# Patient Record
Sex: FEMALE | Race: WHITE | NOT HISPANIC OR LATINO | Employment: OTHER | URBAN - METROPOLITAN AREA
[De-identification: names, ages, dates, MRNs, and addresses within clinical notes are randomized per-mention and may not be internally consistent; named-entity substitution may affect disease eponyms.]

---

## 2017-11-09 ENCOUNTER — RECORDS - HEALTHEAST (OUTPATIENT)
Dept: LAB | Facility: CLINIC | Age: 82
End: 2017-11-09

## 2017-11-09 LAB
CHOLEST SERPL-MCNC: 156 MG/DL
FASTING STATUS PATIENT QL REPORTED: ABNORMAL
HDLC SERPL-MCNC: 39 MG/DL
LDLC SERPL CALC-MCNC: 73 MG/DL
TRIGL SERPL-MCNC: 220 MG/DL

## 2018-05-09 ENCOUNTER — RECORDS - HEALTHEAST (OUTPATIENT)
Dept: LAB | Facility: CLINIC | Age: 83
End: 2018-05-09

## 2018-05-09 LAB
ALBUMIN SERPL-MCNC: 4.3 G/DL (ref 3.5–5)
ALP SERPL-CCNC: 86 U/L (ref 45–120)
ALT SERPL W P-5'-P-CCNC: 20 U/L (ref 0–45)
ANION GAP SERPL CALCULATED.3IONS-SCNC: 14 MMOL/L (ref 5–18)
AST SERPL W P-5'-P-CCNC: 23 U/L (ref 0–40)
BILIRUB SERPL-MCNC: 1 MG/DL (ref 0–1)
BUN SERPL-MCNC: 16 MG/DL (ref 8–28)
CALCIUM SERPL-MCNC: 10.6 MG/DL (ref 8.5–10.5)
CHLORIDE BLD-SCNC: 104 MMOL/L (ref 98–107)
CO2 SERPL-SCNC: 22 MMOL/L (ref 22–31)
CREAT SERPL-MCNC: 0.7 MG/DL (ref 0.6–1.1)
GFR SERPL CREATININE-BSD FRML MDRD: >60 ML/MIN/1.73M2
GLUCOSE BLD-MCNC: 102 MG/DL (ref 70–125)
POTASSIUM BLD-SCNC: 4.3 MMOL/L (ref 3.5–5)
PROT SERPL-MCNC: 7.1 G/DL (ref 6–8)
SODIUM SERPL-SCNC: 140 MMOL/L (ref 136–145)

## 2018-11-20 ENCOUNTER — RECORDS - HEALTHEAST (OUTPATIENT)
Dept: LAB | Facility: CLINIC | Age: 83
End: 2018-11-20

## 2018-11-20 LAB
ALBUMIN SERPL-MCNC: 4.4 G/DL (ref 3.5–5)
ALP SERPL-CCNC: 71 U/L (ref 45–120)
ALT SERPL W P-5'-P-CCNC: 22 U/L (ref 0–45)
ANION GAP SERPL CALCULATED.3IONS-SCNC: 12 MMOL/L (ref 5–18)
AST SERPL W P-5'-P-CCNC: 23 U/L (ref 0–40)
BILIRUB SERPL-MCNC: 0.9 MG/DL (ref 0–1)
BUN SERPL-MCNC: 18 MG/DL (ref 8–28)
CALCIUM SERPL-MCNC: 10.1 MG/DL (ref 8.5–10.5)
CHLORIDE BLD-SCNC: 103 MMOL/L (ref 98–107)
CHOLEST SERPL-MCNC: 150 MG/DL
CO2 SERPL-SCNC: 26 MMOL/L (ref 22–31)
CREAT SERPL-MCNC: 0.68 MG/DL (ref 0.6–1.1)
FASTING STATUS PATIENT QL REPORTED: ABNORMAL
GFR SERPL CREATININE-BSD FRML MDRD: >60 ML/MIN/1.73M2
GLUCOSE BLD-MCNC: 112 MG/DL (ref 70–125)
HDLC SERPL-MCNC: 44 MG/DL
LDLC SERPL CALC-MCNC: 66 MG/DL
POTASSIUM BLD-SCNC: 4.1 MMOL/L (ref 3.5–5)
PROT SERPL-MCNC: 6.9 G/DL (ref 6–8)
SODIUM SERPL-SCNC: 141 MMOL/L (ref 136–145)
TRIGL SERPL-MCNC: 201 MG/DL

## 2019-05-21 ENCOUNTER — RECORDS - HEALTHEAST (OUTPATIENT)
Dept: LAB | Facility: CLINIC | Age: 84
End: 2019-05-21

## 2019-05-21 LAB
CREAT UR-MCNC: 54.9 MG/DL
MICROALBUMIN UR-MCNC: 7.3 MG/DL (ref 0–1.99)
MICROALBUMIN/CREAT UR: 133 MG/G

## 2019-08-02 ENCOUNTER — RECORDS - HEALTHEAST (OUTPATIENT)
Dept: LAB | Facility: CLINIC | Age: 84
End: 2019-08-02

## 2019-08-05 LAB — BACTERIA SPEC CULT: ABNORMAL

## 2019-08-23 ENCOUNTER — RECORDS - HEALTHEAST (OUTPATIENT)
Dept: LAB | Facility: CLINIC | Age: 84
End: 2019-08-23

## 2019-08-24 LAB — BACTERIA SPEC CULT: NORMAL

## 2019-09-13 ENCOUNTER — RECORDS - HEALTHEAST (OUTPATIENT)
Dept: LAB | Facility: CLINIC | Age: 84
End: 2019-09-13

## 2019-09-14 LAB — BACTERIA SPEC CULT: NO GROWTH

## 2019-11-26 ENCOUNTER — RECORDS - HEALTHEAST (OUTPATIENT)
Dept: LAB | Facility: CLINIC | Age: 84
End: 2019-11-26

## 2019-11-26 LAB
ALBUMIN SERPL-MCNC: 4.3 G/DL (ref 3.5–5)
ALP SERPL-CCNC: 83 U/L (ref 45–120)
ALT SERPL W P-5'-P-CCNC: 21 U/L (ref 0–45)
ANION GAP SERPL CALCULATED.3IONS-SCNC: 8 MMOL/L (ref 5–18)
AST SERPL W P-5'-P-CCNC: 21 U/L (ref 0–40)
BILIRUB SERPL-MCNC: 0.8 MG/DL (ref 0–1)
BUN SERPL-MCNC: 14 MG/DL (ref 8–28)
CALCIUM SERPL-MCNC: 9.9 MG/DL (ref 8.5–10.5)
CHLORIDE BLD-SCNC: 104 MMOL/L (ref 98–107)
CHOLEST SERPL-MCNC: 136 MG/DL
CO2 SERPL-SCNC: 28 MMOL/L (ref 22–31)
CREAT SERPL-MCNC: 0.76 MG/DL (ref 0.6–1.1)
FASTING STATUS PATIENT QL REPORTED: ABNORMAL
GFR SERPL CREATININE-BSD FRML MDRD: >60 ML/MIN/1.73M2
GLUCOSE BLD-MCNC: 130 MG/DL (ref 70–125)
HDLC SERPL-MCNC: 41 MG/DL
LDLC SERPL CALC-MCNC: 62 MG/DL
POTASSIUM BLD-SCNC: 4.2 MMOL/L (ref 3.5–5)
PROT SERPL-MCNC: 6.6 G/DL (ref 6–8)
SODIUM SERPL-SCNC: 140 MMOL/L (ref 136–145)
TRIGL SERPL-MCNC: 166 MG/DL

## 2019-11-27 LAB — BACTERIA SPEC CULT: NO GROWTH

## 2021-02-11 ENCOUNTER — RECORDS - HEALTHEAST (OUTPATIENT)
Dept: LAB | Facility: CLINIC | Age: 86
End: 2021-02-11

## 2021-02-11 LAB
ALBUMIN SERPL-MCNC: 4.4 G/DL (ref 3.5–5)
ALP SERPL-CCNC: 89 U/L (ref 45–120)
ALT SERPL W P-5'-P-CCNC: 15 U/L (ref 0–45)
ANION GAP SERPL CALCULATED.3IONS-SCNC: 9 MMOL/L (ref 5–18)
AST SERPL W P-5'-P-CCNC: 16 U/L (ref 0–40)
BILIRUB SERPL-MCNC: 0.6 MG/DL (ref 0–1)
BUN SERPL-MCNC: 19 MG/DL (ref 8–28)
CALCIUM SERPL-MCNC: 9.6 MG/DL (ref 8.5–10.5)
CHLORIDE BLD-SCNC: 105 MMOL/L (ref 98–107)
CHOLEST SERPL-MCNC: 155 MG/DL
CO2 SERPL-SCNC: 26 MMOL/L (ref 22–31)
CREAT SERPL-MCNC: 0.71 MG/DL (ref 0.6–1.1)
FASTING STATUS PATIENT QL REPORTED: ABNORMAL
GFR SERPL CREATININE-BSD FRML MDRD: >60 ML/MIN/1.73M2
GLUCOSE BLD-MCNC: 120 MG/DL (ref 70–125)
HDLC SERPL-MCNC: 36 MG/DL
LDLC SERPL CALC-MCNC: 63 MG/DL
POTASSIUM BLD-SCNC: 4.4 MMOL/L (ref 3.5–5)
PROT SERPL-MCNC: 6.4 G/DL (ref 6–8)
SODIUM SERPL-SCNC: 140 MMOL/L (ref 136–145)
TRIGL SERPL-MCNC: 282 MG/DL
TSH SERPL DL<=0.005 MIU/L-ACNC: 1.13 UIU/ML (ref 0.3–5)

## 2021-05-25 ENCOUNTER — RECORDS - HEALTHEAST (OUTPATIENT)
Dept: ADMINISTRATIVE | Facility: CLINIC | Age: 86
End: 2021-05-25

## 2021-05-26 ENCOUNTER — RECORDS - HEALTHEAST (OUTPATIENT)
Dept: ADMINISTRATIVE | Facility: CLINIC | Age: 86
End: 2021-05-26

## 2021-05-27 ENCOUNTER — RECORDS - HEALTHEAST (OUTPATIENT)
Dept: ADMINISTRATIVE | Facility: CLINIC | Age: 86
End: 2021-05-27

## 2021-05-28 ENCOUNTER — RECORDS - HEALTHEAST (OUTPATIENT)
Dept: ADMINISTRATIVE | Facility: CLINIC | Age: 86
End: 2021-05-28

## 2021-06-01 ENCOUNTER — RECORDS - HEALTHEAST (OUTPATIENT)
Dept: ADMINISTRATIVE | Facility: CLINIC | Age: 86
End: 2021-06-01

## 2021-07-13 ENCOUNTER — RECORDS - HEALTHEAST (OUTPATIENT)
Dept: ADMINISTRATIVE | Facility: CLINIC | Age: 86
End: 2021-07-13

## 2021-07-21 ENCOUNTER — RECORDS - HEALTHEAST (OUTPATIENT)
Dept: ADMINISTRATIVE | Facility: CLINIC | Age: 86
End: 2021-07-21

## 2021-10-03 ENCOUNTER — HOSPITAL ENCOUNTER (OUTPATIENT)
Facility: HOSPITAL | Age: 86
Setting detail: OBSERVATION
Discharge: HOME | End: 2021-10-05
Attending: EMERGENCY MEDICINE | Admitting: HOSPITALIST
Payer: COMMERCIAL

## 2021-10-03 ENCOUNTER — APPOINTMENT (OUTPATIENT)
Dept: RADIOLOGY | Facility: HOSPITAL | Age: 86
End: 2021-10-03
Attending: HOSPITALIST
Payer: COMMERCIAL

## 2021-10-03 DIAGNOSIS — R53.1 GENERALIZED WEAKNESS: ICD-10-CM

## 2021-10-03 PROBLEM — E11.9 TYPE 2 DIABETES MELLITUS WITHOUT COMPLICATION (H): Status: ACTIVE | Noted: 2021-10-03

## 2021-10-03 LAB
ALBUMIN UR-MCNC: NEGATIVE MG/DL
ANION GAP SERPL CALCULATED.3IONS-SCNC: 11 MMOL/L (ref 5–18)
APPEARANCE UR: CLEAR
APTT PPP: 36 SECONDS (ref 22–38)
BILIRUB UR QL STRIP: NEGATIVE
BUN SERPL-MCNC: 20 MG/DL (ref 8–28)
CALCIUM SERPL-MCNC: 10.2 MG/DL (ref 8.5–10.5)
CHLORIDE BLD-SCNC: 105 MMOL/L (ref 98–107)
CO2 SERPL-SCNC: 23 MMOL/L (ref 22–31)
COLOR UR AUTO: ABNORMAL
CREAT SERPL-MCNC: 0.76 MG/DL (ref 0.6–1.1)
ERYTHROCYTE [DISTWIDTH] IN BLOOD BY AUTOMATED COUNT: 12 % (ref 10–15)
GFR SERPL CREATININE-BSD FRML MDRD: 70 ML/MIN/1.73M2
GLUCOSE BLD-MCNC: 129 MG/DL (ref 70–125)
GLUCOSE BLDC GLUCOMTR-MCNC: 143 MG/DL (ref 70–99)
GLUCOSE UR STRIP-MCNC: NEGATIVE MG/DL
HCT VFR BLD AUTO: 36.4 % (ref 35–47)
HGB BLD-MCNC: 12.2 G/DL (ref 11.7–15.7)
HGB UR QL STRIP: NEGATIVE
HOLD SPECIMEN: NORMAL
HYALINE CASTS: 1 /LPF
INR PPP: 1.01 (ref 0.85–1.15)
KETONES UR STRIP-MCNC: NEGATIVE MG/DL
LEUKOCYTE ESTERASE UR QL STRIP: NEGATIVE
MAGNESIUM SERPL-MCNC: 2 MG/DL (ref 1.8–2.6)
MCH RBC QN AUTO: 31.9 PG (ref 26.5–33)
MCHC RBC AUTO-ENTMCNC: 33.5 G/DL (ref 31.5–36.5)
MCV RBC AUTO: 95 FL (ref 78–100)
MUCOUS THREADS #/AREA URNS LPF: PRESENT /LPF
NITRATE UR QL: NEGATIVE
PH UR STRIP: 5 [PH] (ref 5–7)
PLATELET # BLD AUTO: 150 10E3/UL (ref 150–450)
POTASSIUM BLD-SCNC: 4 MMOL/L (ref 3.5–5)
POTASSIUM BLD-SCNC: 4.2 MMOL/L (ref 3.5–5)
RBC # BLD AUTO: 3.83 10E6/UL (ref 3.8–5.2)
RBC URINE: 0 /HPF
SARS-COV-2 RNA RESP QL NAA+PROBE: NEGATIVE
SODIUM SERPL-SCNC: 139 MMOL/L (ref 136–145)
SP GR UR STRIP: 1.01 (ref 1–1.03)
TROPONIN I SERPL-MCNC: <0.01 NG/ML (ref 0–0.29)
TROPONIN I SERPL-MCNC: <0.01 NG/ML (ref 0–0.29)
UROBILINOGEN UR STRIP-MCNC: <2 MG/DL
WBC # BLD AUTO: 4.8 10E3/UL (ref 4–11)
WBC URINE: 0 /HPF

## 2021-10-03 PROCEDURE — 36415 COLL VENOUS BLD VENIPUNCTURE: CPT | Performed by: FAMILY MEDICINE

## 2021-10-03 PROCEDURE — 99285 EMERGENCY DEPT VISIT HI MDM: CPT

## 2021-10-03 PROCEDURE — 83735 ASSAY OF MAGNESIUM: CPT | Performed by: HOSPITALIST

## 2021-10-03 PROCEDURE — 99219 PR INITIAL OBSERVATION CARE,LEVEL II: CPT | Performed by: HOSPITALIST

## 2021-10-03 PROCEDURE — G0378 HOSPITAL OBSERVATION PER HR: HCPCS

## 2021-10-03 PROCEDURE — 250N000013 HC RX MED GY IP 250 OP 250 PS 637: Performed by: HOSPITALIST

## 2021-10-03 PROCEDURE — 36415 COLL VENOUS BLD VENIPUNCTURE: CPT | Performed by: EMERGENCY MEDICINE

## 2021-10-03 PROCEDURE — 85027 COMPLETE CBC AUTOMATED: CPT | Performed by: EMERGENCY MEDICINE

## 2021-10-03 PROCEDURE — 84484 ASSAY OF TROPONIN QUANT: CPT | Mod: 91 | Performed by: HOSPITALIST

## 2021-10-03 PROCEDURE — 84484 ASSAY OF TROPONIN QUANT: CPT | Performed by: EMERGENCY MEDICINE

## 2021-10-03 PROCEDURE — 85610 PROTHROMBIN TIME: CPT | Performed by: EMERGENCY MEDICINE

## 2021-10-03 PROCEDURE — 96372 THER/PROPH/DIAG INJ SC/IM: CPT | Performed by: HOSPITALIST

## 2021-10-03 PROCEDURE — 81001 URINALYSIS AUTO W/SCOPE: CPT | Performed by: EMERGENCY MEDICINE

## 2021-10-03 PROCEDURE — 84132 ASSAY OF SERUM POTASSIUM: CPT | Mod: 91 | Performed by: HOSPITALIST

## 2021-10-03 PROCEDURE — 85730 THROMBOPLASTIN TIME PARTIAL: CPT | Mod: GZ | Performed by: EMERGENCY MEDICINE

## 2021-10-03 PROCEDURE — 80048 BASIC METABOLIC PNL TOTAL CA: CPT | Performed by: EMERGENCY MEDICINE

## 2021-10-03 PROCEDURE — 82607 VITAMIN B-12: CPT | Performed by: FAMILY MEDICINE

## 2021-10-03 PROCEDURE — 250N000011 HC RX IP 250 OP 636: Performed by: HOSPITALIST

## 2021-10-03 PROCEDURE — 71045 X-RAY EXAM CHEST 1 VIEW: CPT

## 2021-10-03 PROCEDURE — 87631 RESP VIRUS 3-5 TARGETS: CPT | Performed by: EMERGENCY MEDICINE

## 2021-10-03 PROCEDURE — 93005 ELECTROCARDIOGRAM TRACING: CPT | Performed by: EMERGENCY MEDICINE

## 2021-10-03 PROCEDURE — C9803 HOPD COVID-19 SPEC COLLECT: HCPCS

## 2021-10-03 PROCEDURE — 82962 GLUCOSE BLOOD TEST: CPT

## 2021-10-03 PROCEDURE — 36415 COLL VENOUS BLD VENIPUNCTURE: CPT | Performed by: HOSPITALIST

## 2021-10-03 PROCEDURE — 87635 SARS-COV-2 COVID-19 AMP PRB: CPT | Performed by: EMERGENCY MEDICINE

## 2021-10-03 RX ORDER — MULTIPLE VITAMINS W/ MINERALS TAB 9MG-400MCG
1 TAB ORAL DAILY
Status: DISCONTINUED | OUTPATIENT
Start: 2021-10-04 | End: 2021-10-05 | Stop reason: HOSPADM

## 2021-10-03 RX ORDER — BISACODYL 10 MG
10 SUPPOSITORY, RECTAL RECTAL DAILY PRN
Status: DISCONTINUED | OUTPATIENT
Start: 2021-10-03 | End: 2021-10-05 | Stop reason: HOSPADM

## 2021-10-03 RX ORDER — ATORVASTATIN CALCIUM 10 MG/1
20 TABLET, FILM COATED ORAL AT BEDTIME
Status: DISCONTINUED | OUTPATIENT
Start: 2021-10-03 | End: 2021-10-05 | Stop reason: HOSPADM

## 2021-10-03 RX ORDER — ASPIRIN 81 MG/1
81 TABLET ORAL DAILY
COMMUNITY

## 2021-10-03 RX ORDER — ONDANSETRON 2 MG/ML
4 INJECTION INTRAMUSCULAR; INTRAVENOUS EVERY 6 HOURS PRN
Status: DISCONTINUED | OUTPATIENT
Start: 2021-10-03 | End: 2021-10-05 | Stop reason: HOSPADM

## 2021-10-03 RX ORDER — LISINOPRIL 2.5 MG/1
2.5 TABLET ORAL DAILY
COMMUNITY

## 2021-10-03 RX ORDER — ACETAMINOPHEN 650 MG/1
650 SUPPOSITORY RECTAL EVERY 6 HOURS PRN
Status: DISCONTINUED | OUTPATIENT
Start: 2021-10-03 | End: 2021-10-05 | Stop reason: HOSPADM

## 2021-10-03 RX ORDER — ASPIRIN 81 MG/1
81 TABLET ORAL DAILY
Status: DISCONTINUED | OUTPATIENT
Start: 2021-10-04 | End: 2021-10-05 | Stop reason: HOSPADM

## 2021-10-03 RX ORDER — METOPROLOL SUCCINATE 50 MG/1
50 TABLET, EXTENDED RELEASE ORAL DAILY
COMMUNITY

## 2021-10-03 RX ORDER — VIT C/B6/B5/MAGNESIUM/HERB 173 50-5-6-5MG
500 CAPSULE ORAL DAILY
COMMUNITY

## 2021-10-03 RX ORDER — LIDOCAINE 40 MG/G
CREAM TOPICAL
Status: DISCONTINUED | OUTPATIENT
Start: 2021-10-03 | End: 2021-10-05 | Stop reason: HOSPADM

## 2021-10-03 RX ORDER — IBUPROFEN 200 MG
400 TABLET ORAL EVERY 8 HOURS PRN
COMMUNITY

## 2021-10-03 RX ORDER — CHLORAL HYDRATE 500 MG
1 CAPSULE ORAL DAILY
COMMUNITY

## 2021-10-03 RX ORDER — ONDANSETRON 4 MG/1
4 TABLET, ORALLY DISINTEGRATING ORAL EVERY 6 HOURS PRN
Status: DISCONTINUED | OUTPATIENT
Start: 2021-10-03 | End: 2021-10-05 | Stop reason: HOSPADM

## 2021-10-03 RX ORDER — MAGNESIUM HYDROXIDE/ALUMINUM HYDROXICE/SIMETHICONE 120; 1200; 1200 MG/30ML; MG/30ML; MG/30ML
30 SUSPENSION ORAL EVERY 4 HOURS PRN
Status: DISCONTINUED | OUTPATIENT
Start: 2021-10-03 | End: 2021-10-05 | Stop reason: HOSPADM

## 2021-10-03 RX ORDER — AMOXICILLIN 250 MG
2 CAPSULE ORAL 2 TIMES DAILY PRN
Status: DISCONTINUED | OUTPATIENT
Start: 2021-10-03 | End: 2021-10-05 | Stop reason: HOSPADM

## 2021-10-03 RX ORDER — OXYBUTYNIN CHLORIDE 5 MG/1
5 TABLET, EXTENDED RELEASE ORAL DAILY
COMMUNITY

## 2021-10-03 RX ORDER — PROCHLORPERAZINE 25 MG
12.5 SUPPOSITORY, RECTAL RECTAL EVERY 12 HOURS PRN
Status: DISCONTINUED | OUTPATIENT
Start: 2021-10-03 | End: 2021-10-05 | Stop reason: HOSPADM

## 2021-10-03 RX ORDER — AMOXICILLIN 250 MG
1 CAPSULE ORAL 2 TIMES DAILY PRN
Status: DISCONTINUED | OUTPATIENT
Start: 2021-10-03 | End: 2021-10-05 | Stop reason: HOSPADM

## 2021-10-03 RX ORDER — MULTIPLE VITAMINS W/ MINERALS TAB 9MG-400MCG
1 TAB ORAL DAILY
COMMUNITY

## 2021-10-03 RX ORDER — LATANOPROST 50 UG/ML
1 SOLUTION/ DROPS OPHTHALMIC AT BEDTIME
Status: DISCONTINUED | OUTPATIENT
Start: 2021-10-03 | End: 2021-10-05 | Stop reason: HOSPADM

## 2021-10-03 RX ORDER — METOPROLOL SUCCINATE 25 MG/1
50 TABLET, EXTENDED RELEASE ORAL DAILY
Status: DISCONTINUED | OUTPATIENT
Start: 2021-10-04 | End: 2021-10-05 | Stop reason: HOSPADM

## 2021-10-03 RX ORDER — DORZOLAMIDE HCL 20 MG/ML
1 SOLUTION/ DROPS OPHTHALMIC 2 TIMES DAILY
Status: DISCONTINUED | OUTPATIENT
Start: 2021-10-03 | End: 2021-10-05 | Stop reason: HOSPADM

## 2021-10-03 RX ORDER — PROCHLORPERAZINE MALEATE 5 MG
5 TABLET ORAL EVERY 6 HOURS PRN
Status: DISCONTINUED | OUTPATIENT
Start: 2021-10-03 | End: 2021-10-05 | Stop reason: HOSPADM

## 2021-10-03 RX ORDER — ACETAMINOPHEN 325 MG/1
650 TABLET ORAL EVERY 6 HOURS PRN
Status: DISCONTINUED | OUTPATIENT
Start: 2021-10-03 | End: 2021-10-05 | Stop reason: HOSPADM

## 2021-10-03 RX ORDER — OXYBUTYNIN CHLORIDE 5 MG/1
5 TABLET, EXTENDED RELEASE ORAL DAILY
Status: DISCONTINUED | OUTPATIENT
Start: 2021-10-04 | End: 2021-10-05 | Stop reason: HOSPADM

## 2021-10-03 RX ORDER — LISINOPRIL 2.5 MG/1
2.5 TABLET ORAL DAILY
Status: DISCONTINUED | OUTPATIENT
Start: 2021-10-04 | End: 2021-10-05 | Stop reason: HOSPADM

## 2021-10-03 RX ORDER — DORZOLAMIDE HCL 20 MG/ML
1 SOLUTION/ DROPS OPHTHALMIC 2 TIMES DAILY
COMMUNITY

## 2021-10-03 RX ADMIN — ENOXAPARIN SODIUM 40 MG: 40 INJECTION SUBCUTANEOUS at 22:38

## 2021-10-03 RX ADMIN — ATORVASTATIN CALCIUM 20 MG: 10 TABLET, FILM COATED ORAL at 22:38

## 2021-10-03 RX ADMIN — DORZOLAMIDE HCL 1 DROP: 20 SOLUTION/ DROPS OPHTHALMIC at 23:04

## 2021-10-03 RX ADMIN — LATANOPROST 1 DROP: 50 SOLUTION/ DROPS OPHTHALMIC at 23:04

## 2021-10-03 ASSESSMENT — ACTIVITIES OF DAILY LIVING (ADL)
DRESSING/BATHING_DIFFICULTY: NO
WEAR_GLASSES_OR_BLIND: YES
TOILETING_ISSUES: NO
TOILETING_ASSISTANCE: TOILETING DIFFICULTY, ASSISTANCE 1 PERSON
USE_OF_HEARING_ASSISTIVE_DEVICES: BILATERAL HEARING AIDS
DRESSING/BATHING: BATHING DIFFICULTY, ASSISTANCE 1 PERSON
DOING_ERRANDS_INDEPENDENTLY_DIFFICULTY: YES
WALKING_OR_CLIMBING_STAIRS_DIFFICULTY: YES
EQUIPMENT_CURRENTLY_USED_AT_HOME: WALKER, STANDARD
CONCENTRATING,_REMEMBERING_OR_MAKING_DECISIONS_DIFFICULTY: NO
HEARING_DIFFICULTY_OR_DEAF: YES
WALKING_OR_CLIMBING_STAIRS: AMBULATION DIFFICULTY, ASSISTANCE 1 PERSON
DIFFICULTY_EATING/SWALLOWING: NO
DIFFICULTY_COMMUNICATING: NO

## 2021-10-03 ASSESSMENT — MIFFLIN-ST. JEOR: SCORE: 1062.73

## 2021-10-03 NOTE — ED NOTES
Bed: JNED-04  Expected date: 10/3/21  Expected time: 5:54 PM  Means of arrival: Ambulance  Comments:  Perla EPPS weak

## 2021-10-03 NOTE — ED TRIAGE NOTES
Pt arrives via [facility] from home. Pt states for the past 2 hours, she has felt very generally weak. Denies numbness/tingling, cp, or sob. .

## 2021-10-03 NOTE — ED PROVIDER NOTES
Emergency Department Encounter     Evaluation Date & Time:   10/3/2021  6:02 PM    CHIEF COMPLAINT:  Fatigue      Triage Note:Pt arrives via [facility] from home. Pt states for the past 2 hours, she has felt very generally weak. Denies numbness/tingling, cp, or sob. .         Impression and Plan       FINAL IMPRESSION:    ICD-10-CM    1. Generalized weakness  R53.1          ED COURSE & MEDICAL DECISION MAKIN:30 PM I met with the patient, obtained history, performed an initial exam, and discussed options and plan for diagnostics and treatment here in the ED.    89 year old female, history of DM 2 and HTN, who presents via EMS for evaluation of extreme fatigue and profound generalized weakness x 2 hours. No focal weakness / paresthesias or associated headache. She otherwise denies chest pain, SOB, back pain, abdominal pain, N/V/D, cough, fever.    Neuro exam is normal except patient unclear with time (, September).    Patient placed on cardiac monitor, IV access established and blood sent for labs.    EKG performed and demonstrated NSR with no ischemic changes; troponin WNL (<0.01).    Although patient has been fully vaccinated for Covid, Covid considered and was negative.    Labs otherwise remarkable for no leukocytosis, anemia, significant electrolyte derangements or renal impairment.  UA negative for infection.    Influenza considered and test is pending.    Given advanced age and symptoms, decision made to admit patient for further monitoring.  Patient hemodynamically stable throughout ED course.      At the conclusion of the encounter I discussed the results of all the tests and the disposition. The questions were answered. The patient acknowledged understanding and was agreeable with the care plan.    MEDICATIONS GIVEN IN THE EMERGENCY DEPARTMENT:  Medications - No data to display    NEW PRESCRIPTIONS STARTED AT TODAY'S ED VISIT:  New Prescriptions    No medications on file       HPI      HPI   Nae Mandel is a 89 year old female with a pertinent history of DM 2 and HTN, who presents to this ED via EMS for evaluation of fatigue. Patient reports onset of generalized weakness a couple of hours ago. She denies focal weakness and paresthesias.  No associated headache.     She otherwise denies chest pain, SOB, back pain, abdominal pain, N/V/D, cough, fever or other concerns.    Patient was fully vaccinated for COVID.     REVIEW OF SYSTEMS:  All other systems reviewed and are negative.      Medical History     No past medical history on file.    Past Surgical History:   Procedure Laterality Date     ARTHROPLASTY KNEE       OTHER SURGICAL HISTORY      Bilateral hip arthroplasty       Family History   Problem Relation Age of Onset     Hereditary Breast and Ovarian Cancer Syndrome No family hx of      Breast Cancer No family hx of      Cancer No family hx of      Colon Cancer No family hx of      Endometrial Cancer No family hx of      Ovarian Cancer No family hx of        Social History     Tobacco Use     Smoking status: Never Smoker   Substance Use Topics     Alcohol use: No     Drug use: No       atenolol (TENORMIN) 50 MG tablet  atorvastatin (LIPITOR) 20 MG tablet  cephalexin (KEFLEX) 250 MG capsule  latanoprost (XALATAN) 0.005 % ophthalmic solution  oxyCODONE-acetaminophen (PERCOCET) 5-325 mg per tablet        Physical Exam     First Vitals:  Patient Vitals for the past 24 hrs:   BP Temp Temp src Pulse Resp SpO2   10/03/21 1808 -- 98.3  F (36.8  C) Oral -- -- --   10/03/21 1804 (!) 202/98 -- Oral 72 18 95 %       PHYSICAL EXAM:   Physical Exam    GENERAL: Awake, alert.  In no acute distress, however appears mildly generally weak.  HEENT: Normocephalic, atraumatic. Pupils equal, round and reactive. Conjunctiva normal. EOMI without nystagmus. Tongue is midline.  NECK: No stridor.  PULMONARY: Symmetrical breath sounds without distress.  Lungs clear to auscultation bilaterally without wheezes,  rhonchi or rales.  CARDIO: Regular rate and rhythm.  No significant murmur, rub or gallop.  Radial pulses strong and symmetrical.  ABDOMINAL: Abdomen soft, non-distended and non-tender to palpation.   EXTREMITIES: No lower extremity swelling or edema.      NEURO: Alert and oriented to person and place, but not to time (year - 2022, month - September).  Cranial nerves III-XII intact. Strength 5/5 BL upper and lower extremities with sensation to light touch grossly intact.  PSYCH: Normal mood and affect.  SKIN: No rashes.     Results     LAB:  All pertinent labs reviewed and interpreted  Labs Ordered and Resulted from Time of ED Arrival Up to the Time of Departure from the ED   BASIC METABOLIC PANEL - Abnormal; Notable for the following components:       Result Value    Glucose 129 (*)     All other components within normal limits   ROUTINE UA WITH MICROSCOPIC REFLEX TO CULTURE - Abnormal; Notable for the following components:    Mucus Urine Present (*)     All other components within normal limits    Narrative:     Urine Culture not indicated   CBC WITH PLATELETS - Normal   TROPONIN I - Normal   INR - Normal   PARTIAL THROMBOPLASTIN TIME - Normal   COVID-19 VIRUS (CORONAVIRUS) BY PCR - Normal    Narrative:     Testing was performed using the [employer]el  SARS-CoV-2 & Influenza A/B Assay on the [employer]  System.  This test should be ordered for the detection of SARS-COV-2 in individuals who meet SARS-CoV-2 clinical and/or epidemiological criteria. Test performance is unknown in asymptomatic patients.  This test is for in vitro diagnostic use under the FDA EUA for laboratories certified under CLIA to perform moderate and/or high complexity testing. This test has not been FDA cleared or approved.  A negative test does not rule out the presence of PCR inhibitors in the specimen or target RNA in concentration below the limit of detection for the assay. The possibility of a false negative should be considered if the patient's  recent exposure or clinical presentation suggests COVID-19.  [facility] are certified under the Clinical Laboratory Improvement Amendments of 1988 (CLIA-88) as qualified to perform moderate and/or high complexity laboratory testing.   EXTRA RED TOP TUBE   CARDIAC CONTINUOUS MONITORING   MAY SALINE LOCK IV   CALL   INFLUENZA A/B ANTIGEN   EXTRA TUBE    Narrative:     The following orders were created for panel order [facility] Draw.  Procedure                               Abnormality         Status                     ---------                               -----------         ------                     Extra Red Top Tube[[id number]]                               In process                   Please view results for these tests on the individual orders.       ECG:  10/3/2021, 18:10; NSR with rate of 72 bpm; normal intervals; normal conduction; no ST-T wave changes consistent with ACS or pericarditis; no previous EKG available for comparison    EKG independently reviewed and interpreted by MD Martine Krueger MD  Emergency Medicine  [facility] EMERGENCY DEPARTMENT           Martine Collazo MD  10/04/21 0959

## 2021-10-04 ENCOUNTER — APPOINTMENT (OUTPATIENT)
Dept: MRI IMAGING | Facility: HOSPITAL | Age: 86
End: 2021-10-04
Attending: FAMILY MEDICINE
Payer: COMMERCIAL

## 2021-10-04 ENCOUNTER — APPOINTMENT (OUTPATIENT)
Dept: OCCUPATIONAL THERAPY | Facility: HOSPITAL | Age: 86
End: 2021-10-04
Attending: HOSPITALIST
Payer: COMMERCIAL

## 2021-10-04 ENCOUNTER — APPOINTMENT (OUTPATIENT)
Dept: CARDIOLOGY | Facility: HOSPITAL | Age: 86
End: 2021-10-04
Attending: HOSPITALIST
Payer: COMMERCIAL

## 2021-10-04 ENCOUNTER — APPOINTMENT (OUTPATIENT)
Dept: PHYSICAL THERAPY | Facility: HOSPITAL | Age: 86
End: 2021-10-04
Attending: HOSPITALIST
Payer: COMMERCIAL

## 2021-10-04 LAB
ANION GAP SERPL CALCULATED.3IONS-SCNC: 8 MMOL/L (ref 5–18)
BUN SERPL-MCNC: 18 MG/DL (ref 8–28)
CALCIUM SERPL-MCNC: 9.5 MG/DL (ref 8.5–10.5)
CHLORIDE BLD-SCNC: 110 MMOL/L (ref 98–107)
CK SERPL-CCNC: 86 U/L (ref 30–190)
CO2 SERPL-SCNC: 24 MMOL/L (ref 22–31)
CREAT SERPL-MCNC: 0.67 MG/DL (ref 0.6–1.1)
ERYTHROCYTE [DISTWIDTH] IN BLOOD BY AUTOMATED COUNT: 12 % (ref 10–15)
FLUAV RNA SPEC QL NAA+PROBE: NEGATIVE
FLUBV RNA RESP QL NAA+PROBE: NEGATIVE
GFR SERPL CREATININE-BSD FRML MDRD: 78 ML/MIN/1.73M2
GLUCOSE BLD-MCNC: 100 MG/DL (ref 70–125)
HCT VFR BLD AUTO: 35.8 % (ref 35–47)
HGB BLD-MCNC: 11.7 G/DL (ref 11.7–15.7)
LVEF ECHO: NORMAL
MAGNESIUM SERPL-MCNC: 2.1 MG/DL (ref 1.8–2.6)
MCH RBC QN AUTO: 31.1 PG (ref 26.5–33)
MCHC RBC AUTO-ENTMCNC: 32.7 G/DL (ref 31.5–36.5)
MCV RBC AUTO: 95 FL (ref 78–100)
PLATELET # BLD AUTO: 145 10E3/UL (ref 150–450)
POTASSIUM BLD-SCNC: 3.8 MMOL/L (ref 3.5–5)
RBC # BLD AUTO: 3.76 10E6/UL (ref 3.8–5.2)
RSV RNA SPEC NAA+PROBE: NEGATIVE
SODIUM SERPL-SCNC: 142 MMOL/L (ref 136–145)
TROPONIN I SERPL-MCNC: 0.01 NG/ML (ref 0–0.29)
TSH SERPL DL<=0.005 MIU/L-ACNC: 0.64 UIU/ML (ref 0.3–5)
VIT B12 SERPL-MCNC: 586 PG/ML (ref 213–816)
WBC # BLD AUTO: 4.7 10E3/UL (ref 4–11)

## 2021-10-04 PROCEDURE — 36415 COLL VENOUS BLD VENIPUNCTURE: CPT | Performed by: HOSPITALIST

## 2021-10-04 PROCEDURE — 97161 PT EVAL LOW COMPLEX 20 MIN: CPT | Mod: GP

## 2021-10-04 PROCEDURE — 96372 THER/PROPH/DIAG INJ SC/IM: CPT | Performed by: HOSPITALIST

## 2021-10-04 PROCEDURE — 83735 ASSAY OF MAGNESIUM: CPT | Performed by: HOSPITALIST

## 2021-10-04 PROCEDURE — 84443 ASSAY THYROID STIM HORMONE: CPT | Performed by: FAMILY MEDICINE

## 2021-10-04 PROCEDURE — 85027 COMPLETE CBC AUTOMATED: CPT | Performed by: HOSPITALIST

## 2021-10-04 PROCEDURE — 84484 ASSAY OF TROPONIN QUANT: CPT | Performed by: HOSPITALIST

## 2021-10-04 PROCEDURE — 93306 TTE W/DOPPLER COMPLETE: CPT

## 2021-10-04 PROCEDURE — 97166 OT EVAL MOD COMPLEX 45 MIN: CPT | Mod: GO

## 2021-10-04 PROCEDURE — 97116 GAIT TRAINING THERAPY: CPT | Mod: GP

## 2021-10-04 PROCEDURE — 93306 TTE W/DOPPLER COMPLETE: CPT | Mod: 26 | Performed by: INTERNAL MEDICINE

## 2021-10-04 PROCEDURE — 250N000011 HC RX IP 250 OP 636: Performed by: HOSPITALIST

## 2021-10-04 PROCEDURE — 80048 BASIC METABOLIC PNL TOTAL CA: CPT | Performed by: HOSPITALIST

## 2021-10-04 PROCEDURE — 70551 MRI BRAIN STEM W/O DYE: CPT

## 2021-10-04 PROCEDURE — G0378 HOSPITAL OBSERVATION PER HR: HCPCS

## 2021-10-04 PROCEDURE — 97535 SELF CARE MNGMENT TRAINING: CPT | Mod: GO

## 2021-10-04 PROCEDURE — 82550 ASSAY OF CK (CPK): CPT | Performed by: FAMILY MEDICINE

## 2021-10-04 PROCEDURE — 250N000013 HC RX MED GY IP 250 OP 250 PS 637: Performed by: HOSPITALIST

## 2021-10-04 PROCEDURE — 99205 OFFICE O/P NEW HI 60 MIN: CPT | Performed by: PSYCHIATRY & NEUROLOGY

## 2021-10-04 PROCEDURE — 99226 PR SUBSEQUENT OBSERVATION CARE,LEVEL III: CPT | Performed by: FAMILY MEDICINE

## 2021-10-04 RX ADMIN — MULTIPLE VITAMINS W/ MINERALS TAB 1 TABLET: TAB at 08:18

## 2021-10-04 RX ADMIN — LATANOPROST 1 DROP: 50 SOLUTION/ DROPS OPHTHALMIC at 21:05

## 2021-10-04 RX ADMIN — DORZOLAMIDE HCL 1 DROP: 20 SOLUTION/ DROPS OPHTHALMIC at 21:05

## 2021-10-04 RX ADMIN — DORZOLAMIDE HCL 1 DROP: 20 SOLUTION/ DROPS OPHTHALMIC at 08:18

## 2021-10-04 RX ADMIN — ENOXAPARIN SODIUM 40 MG: 40 INJECTION SUBCUTANEOUS at 21:05

## 2021-10-04 RX ADMIN — LISINOPRIL 2.5 MG: 2.5 TABLET ORAL at 08:18

## 2021-10-04 RX ADMIN — ATORVASTATIN CALCIUM 20 MG: 10 TABLET, FILM COATED ORAL at 21:04

## 2021-10-04 RX ADMIN — ASPIRIN 81 MG: 81 TABLET, COATED ORAL at 08:18

## 2021-10-04 RX ADMIN — OXYBUTYNIN CHLORIDE 5 MG: 5 TABLET, FILM COATED, EXTENDED RELEASE ORAL at 08:18

## 2021-10-04 RX ADMIN — METOPROLOL SUCCINATE 50 MG: 25 TABLET, EXTENDED RELEASE ORAL at 08:18

## 2021-10-04 ASSESSMENT — ACTIVITIES OF DAILY LIVING (ADL)
DEPENDENT_IADLS:: INDEPENDENT
PREVIOUS_RESPONSIBILITIES: MEAL PREP;LAUNDRY;SHOPPING;MEDICATION MANAGEMENT;FINANCES

## 2021-10-04 ASSESSMENT — MIFFLIN-ST. JEOR: SCORE: 1056.84

## 2021-10-04 NOTE — H&P
Admission History and Physical   Nae Mandel,  1931, MRN [id number]    [facility]    PCP: Nora Esteves, [phone]          Extended Emergency Contact Information  Primary Emergency Contact: Alex Mandel  Address: [address]           [place] [address] [facility]  Home Phone: [phone]  Relation: Spouse  Secondary Emergency Contact: Connie Garay   [facility]  Home Phone: [phone]  Mobile Phone: [phone]  Relation: Daughter       Assessment and Plan       89F with HTN and HLD who presents with several hours of generalized weakness without clear infectious or metabolic cause    #non specific generalized weakness - monitor overnight.  Encourage PO.    -flu pending.   -Tele, ECHO, trops.  Consider neurology evaluation if persists and no clear cause identified  -repeat Hb in AM  -PT/OT    #HTN - not well controlled. resume home meds.  Hydralazine PRN.    #glaucoma - eye drop      Clinically Significant Risk Factors Present on Admission              # Platelet Defect: home medication list includes an antiplatelet medication        Checklist:  Code Status:  full  Diet:  regular  Ramos Catheter: Not present  Central Lines: None       Chief Complaint: Generalized weakness     HPI:    Nae Mandel is a 89 year old old female with HTN, HLD, glaucoma who presents with generalized weakness.  Mrs. Mandel noted lethargy and generalized weakness that noticed significant generalized weakness that started around 4PM today while doing her regular household chores.  Symptoms persisted and she came in for evaluation.  Daughter noticed some mild confusion.  No other associated symptoms including fever, chest pain, SOB, palpitations, abdominal pain, dehydration    In the ER: no clear etiology identified     History is provided by patient       Physical Exam:  Temp:  [98.3  F (36.8  C)] 98.3  F (36.8  C)  Pulse:  [71-74] 74  Resp:  [18-24] 18  BP: (160-202)/(72-98)  169/74  SpO2:  [92 %-96 %] 95 %  BP (!) 169/74   Pulse 74   Temp 98.3  F (36.8  C) (Oral)   Resp 18   SpO2 95%      General:  Alert, cooperative, no distress,  Appears stated age  Neurologic:  oriented, facial symmetry preserved, fluent speech.  Extremity strength symmetric and seemingly full, did not ambulate, Moves all 4 spontaneously  Psych: calm, mood and affect appropriate to situation  HEENT:  Anicteric, MMM, unremarkable dentition  CV: RRR no MRG, normal S1 and S2, no edema  Lungs: CTAB.  Easyrespirations  Abd: soft, NT, normoactive BS  Skin: no rashes noted on exposed skin. Color and turgor normal  Central Lines and Tubes: None (no womack, CVC, feeding tubes)         Pertinent Test Findings  Radiology Results (results reviewed):   No results found for this visit on 10/03/21.    EKG (personally reviewed): normal sinus rhythm and no acute ischemic changes      Medical History  No past medical history on file.     Surgical History  Past Surgical History:   Procedure Laterality Date     ARTHROPLASTY KNEE       OTHER SURGICAL HISTORY      Bilateral hip arthroplasty          Social History  Social History     Tobacco Use     Smoking status: Never Smoker   Substance Use Topics     Alcohol use: No     Drug use: No          Allergies  Allergies   Allergen Reactions     Epinephrine Unknown    Family History  Medical History Relation Name Comments   Good Health Brother       Other Father   Emphasemia   Good Health Mother       Good Health Sister         Relation Name Status Comments   Brother   Alive     Brother         Father        Maternal Grandfather        Maternal Grandmother        Mother        Paternal Grandfather        Paternal Grandmother        Sister   Alive     Sister                       Prior to Admission Medications   Prior to Admission Medications   Prescriptions Last Dose Informant Patient Reported? Taking?   Turmeric 500 MG CAPS 10/3/2021 at  Unknown time  Yes Yes   Sig: Take 500 mg by mouth daily   aspirin 81 MG EC tablet 10/3/2021 at Unknown time  Yes Yes   Sig: Take 81 mg by mouth daily   atorvastatin (LIPITOR) 20 MG tablet 10/2/2021 at Unknown time  Yes Yes   Sig: [ATORVASTATIN (LIPITOR) 20 MG TABLET] Take 20 mg by mouth bedtime.   cephalexin (KEFLEX) 250 MG capsule Unknown at Unknown time  Yes Yes   Sig: Take 1,000 mg by mouth continuous prn (prior to dental appointment)    dorzolamide (TRUSOPT) 2 % ophthalmic solution 10/3/2021 at am  Yes Yes   Sig: Place 1 drop into both eyes 2 times daily   fish oil-omega-3 fatty acids 1000 MG capsule 10/3/2021 at Unknown time  Yes Yes   Sig: Take 1 g by mouth daily   ibuprofen (ADVIL/MOTRIN) 200 MG tablet Unknown at Unknown time  Yes Yes   Sig: Take 400 mg by mouth every 8 hours as needed for mild pain   latanoprost (XALATAN) 0.005 % ophthalmic solution 10/2/2021 at Unknown time  Yes Yes   Sig: Place 1 drop into both eyes At Bedtime    lisinopril (ZESTRIL) 2.5 MG tablet 10/3/2021 at Unknown time  Yes Yes   Sig: Take 2.5 mg by mouth daily   metoprolol succinate ER (TOPROL-XL) 50 MG 24 hr tablet 10/3/2021 at Unknown time  Yes Yes   Sig: Take 50 mg by mouth daily   multivitamin w/minerals (THERA-VIT-M) tablet 10/3/2021 at Unknown time  Yes Yes   Sig: Take 1 tablet by mouth daily   oxybutynin ER (DITROPAN-XL) 5 MG 24 hr tablet 10/3/2021 at Unknown time  Yes Yes   Sig: Take 5 mg by mouth daily      Facility-Administered Medications: None          Review of Systems:    10point review of systems negative except as listed in HPI      Pertinent Labs  Lab Results: personally reviewed.     Most Recent 3 CBC's:  Recent Labs   Lab Test 10/03/21  1822   WBC 4.8   HGB 12.2   MCV 95        Most Recent 3 BMP's:  Recent Labs   Lab Test 10/03/21  1822 02/11/21  1602 11/26/19  1044    140 140   POTASSIUM 4.0 4.4 4.2   CHLORIDE 105 105 104   CO2 23 26 28   BUN 20 19 14   CR 0.76 0.71 0.76   ANIONGAP 11 9 8   RACHEAL 10.2  9.6 9.9   * 120 130*     Most Recent 2 LFT's:  Recent Labs   Lab Test 02/11/21  1602 11/26/19  1044   AST 16 21   ALT 15 21   ALKPHOS 89 83   BILITOTAL 0.6 0.8     Most Recent 3 INR's:  Recent Labs   Lab Test 10/03/21  1822   INR 1.01     Most Recent 3 Troponin's:No lab results found.    Chad Lopez MD  Internal Medicine Hospitalist  10/3/2021  8:24 PM

## 2021-10-04 NOTE — PROGRESS NOTES
[facility]      OUTPATIENT OCCUPATIONAL THERAPY  EVALUATION  PLAN OF TREATMENT FOR OUTPATIENT REHABILITATION  (COMPLETE FOR INITIAL CLAIMS ONLY)  Patient's Last Name, First Name, M.I.  YOB: 1931  Nae Mandel                          Provider's Name  [facility] Medical Record No.  [id number]                               Onset Date:  (P) 10/03/21   Start of Care Date:  (P) 10/04/21     Type:     ___PT   _X_OT   ___SLP Medical Diagnosis:  (P) weakness                        OT Diagnosis:  (P) weakness, fatigue   Visits from SOC:  1   _________________________________________________________________________________  Plan of Treatment/Functional Goals    Planned Interventions: (P) ADL retraining, strengthening   Goals: See Occupational Therapy Goals on Care Plan in e(ye)BRAIN electronic health record.    Therapy Frequency: (P) Daily  Predicted Duration of Therapy Intervention: (P) 7 days  _________________________________________________________________________________    I CERTIFY THE NEED FOR THESE SERVICES FURNISHED UNDER        THIS PLAN OF TREATMENT AND WHILE UNDER MY CARE     (Physician co-signature of this document indicates review and certification of the therapy plan).                Certification date from: (P) 10/04/21, Certification date to: (P) 10/10/21    Referring Physician: (P) Dr jones            Initial Assessment        See Occupational Therapy evaluation dated (P) 10/04/21 in Epic electronic health record.

## 2021-10-04 NOTE — PROGRESS NOTES
PRIMARY DIAGNOSIS: GENERALIZED WEAKNESS    OUTPATIENT/OBSERVATION GOALS TO BE MET BEFORE DISCHARGE  1. Orthostatic performed: N/A    2. Tolerating PO medications: Yes    3. Return to near baseline physical activity: Yes    4. Cleared for discharge by consultants (if involved): No    Discharge Planner Nurse   Safe discharge environment identified: Yes  Barriers to discharge: Yes       Entered by: Viola Greenwood 10/04/2021 6:12 AM     Trops have been negative x 2,  Waiting on ECHO.     Please review provider order for any additional goals.   Nurse to notify provider when observation goals have been met and patient is ready for discharge.

## 2021-10-04 NOTE — PLAN OF CARE
Pt alert and oriented to self, place, situation. States it is September 3rd, unsure of year. Hard of hearing. Reports feeling weak. 1 assist to bathroom. Continent. Voided. Bed alarm. Potassium and Magnesium were drawn and within normal range, recheck for draw in AM.

## 2021-10-04 NOTE — PROGRESS NOTES
[facility]    Medicine Progress Note - Hospitalist Service       Date of Admission:  10/3/2021  Active Problems:    Generalized weakness     Assessment & Plan           89F with HTN, glaucoma, HLD admitted for weakness and confusion    Weakness, confusion  Unclear cause, resolving.  No arrhythmias.  Troponin, BMP, CBC unremarkable.  MRI of the head shows no acute finding.  Nonfocal neurologic exam.  UA, chest x-ray unremarkable.  Check CK, B12, TSH, orthostatics, continue to monitor on telemetry.  Will ask neurology to evaluate, question EEG.  PT OT. improved    Essential hypertension-accelerated on admission.  Improved now.  Continue lisinopril, metoprolol.  Hydralazine as needed    Glaucoma-continue home medication      Clinically Significant Risk Factors Present on Admission                    # Platelet Defect: home medication list includes an antiplatelet medication            Diet: Combination Diet Regular Diet Adult    DVT Prophylaxis: Heparin SQ  Ramos Catheter: Not present  Central Lines: None  Code Status: Full Code      Disposition Plan   Expected discharge: 10/05/2021   recommended to prior living arrangement once Neurology evaluation.     The patient's care was discussed with the Bedside Nurse, Care Coordinator/, Patient and Patient's Family. for total time 88 minutes with greater than 50% of total time spent in counseling and coordination of care.    ROBERT ROSALES MD  Hospitalist Service  [facility]  Securely message with the Vocera Web Console (learn more here)  Text page via [employer] Paging/Directory        Clinically Significant Risk Factors Present on Admission              # Platelet Defect: home medication list includes an antiplatelet medication      ______________________________________________________________________    Interval History   Remainder of 12 point review of systems negative except as noted below    Subjective:  Patient  "doing better.  Denies weakness, chest pain, shortness of breath or confusion.  No dysuria or hematuria.  No fevers or chills or cough.    Data reviewed today: I reviewed all medications, new labs and imaging results over the last 24 hours.     Physical Exam   Vital Signs: Temp: 98  F (36.7  C) Temp src: Oral BP: 126/61 Pulse: 73   Resp: 18 SpO2: 95 % O2 Device: None (Room air)    Weight: 154 lbs 6.4 oz  Physical Exam:  Temp:  [98  F (36.7  C)-98.6  F (37  C)] 98  F (36.7  C)  Pulse:  [70-78] 73  Resp:  [16-32] 18  BP: (126-204)/(61-98) 126/61  SpO2:  [92 %-96 %] 95 %    /61 (BP Location: Right arm)   Pulse 73   Temp 98  F (36.7  C) (Oral)   Resp 18   Ht 1.549 m (5' 1\")   Wt 70 kg (154 lb 6.4 oz)   SpO2 95%   BMI 29.17 kg/m    General appearance: alert, appears stated age and cooperative  Head: Normocephalic, without obvious abnormality, atraumatic  Eyes: Clear conjuctiva  Neck: no JVD and supple, symmetrical, trachea midline  Lungs: clear to auscultation bilaterally  Heart: regular rate and rhythm, S1, S2 normal, no murmur, click, rub or gallop  Abdomen: soft, non-tender; bowel sounds normal; no masses,  no organomegaly  Extremities: Negative homans,   Skin: Skin color, texture, turgor normal. No rashes or lesions  Neurologic: Mental status: alertness: alert  Cranial nerves: normal  Sensory: normal  Motor:grossly normal  Positive ankle reflexes bilaterally        Data   Recent Labs   Lab 10/04/21  0448 10/03/21  2213 10/03/21  2138 10/03/21  1822   WBC 4.7  --   --  4.8   HGB 11.7  --   --  12.2   MCV 95  --   --  95   *  --   --  150   INR  --   --   --  1.01     --   --  139   POTASSIUM 3.8 4.2  --  4.0   CHLORIDE 110*  --   --  105   CO2 24  --   --  23   BUN 18  --   --  20   CR 0.67  --   --  0.76   ANIONGAP 8  --   --  11   RACHEAL 9.5  --   --  10.2     --  143* 129*     Recent Results (from the past 24 hour(s))   XR Chest Port 1 View    Narrative    EXAM: XR CHEST PORT 1 " VIEW  LOCATION: [facility]  DATE/TIME: 10/3/2021 8:28 PM    INDICATION: unexplained weaknes.  JARVIS.  ?PNA  COMPARISON: None.      Impression    IMPRESSION: Mild cardiomegaly. Lungs are clear. No pleural effusion or pneumothorax.   MR Brain w/o Contrast    Narrative    EXAM: MR BRAIN W/O CONTRAST  LOCATION: [facility]  DATE/TIME: 10/4/2021 9:01 AM    INDICATION: History of altered mental status.  COMPARISON: None available in PACS  TECHNIQUE: Routine multiplanar multisequence head MRI without intravenous contrast.    FINDINGS:  INTRACRANIAL CONTENTS: No acute or subacute infarct. No mass, acute hemorrhage, or extra-axial fluid collections. Moderate global parenchymal volume loss with concordant prominence of the ventricular system. Patchy periventricular and subcortical white   matter T2/FLAIR signal hyperintensity. Normal position of the cerebellar tonsils. Prominent senescent basal ganglia mineralization.    SELLA: No abnormality accounting for technique.    OSSEOUS STRUCTURES/SOFT TISSUES: Normal marrow signal. The major intracranial vascular flow voids are maintained.     ORBITS: No abnormality accounting for technique. Cataract surgery bilaterally.    SINUSES/MASTOIDS: No paranasal sinus mucosal disease. No middle ear or mastoid effusion.       Impression    IMPRESSION:    1. No evidence of acute intracranial abnormality. No evidence of hemorrhage, mass or acute infarct.    2. Nonspecific white matter disease, most commonly due to chronic microvascular ischemia in a patient of this age.   Echocardiogram Complete   Result Value    LVEF  60-65%    Narrative    [id number]  [id number]  [id number]  [id number]^[place] [address]     Name: JUAN JOSE GALLAGHER  MRN: [id number]  : 1931  Study Date: 10/04/2021 01:17 PM  Age: 89 yrs  Gender: Female  Patient Location: [facility]  Reason For Study: JARVIS  Ordering Physician:  ABDELRAHMAN BROWN  Performed By: KD     BSA: 1.7 m2  Height: 61 in  Weight: 154 lb  ______________________________________________________________________________  ______________________________________________________________________________  Interpretation Summary     Left ventricular size, wall motion and function are normal. The ejection  fraction is 60-65%.  Normal right ventricle size and systolic function.  IVC diameter <2.1 cm collapsing >50% with sniff suggests a normal RA pressure  of 3 mmHg.  No hemodynamically significant valvular abnormalities on 2D or color flow  imaging.  ______________________________________________________________________________  I      WMSI = 1.00     % Normal = 100     X - Cannot   0 -                      (2) - Mildly 2 -          Segments  Size  Interpret    Hyperkinetic 1 - Normal  Hypokinetic  Hypokinetic  1-2     small                                                     7 -          3-5      moderate  3 - Akinetic 4 -          5 -         6 - Akinetic Dyskinetic   6-14    large               Dyskinetic   Aneurysmal  w/scar       w/scar       15-16   diffuse     Left Ventricle  Left ventricular size, wall motion and function are normal. The ejection  fraction is 60-65%. There is normal left ventricular wall thickness. Left  ventricular diastolic function is normal. No regional wall motion  abnormalities noted.     Right Ventricle  Normal right ventricle size and systolic function.     Atria  Normal left atrial size. Right atrial size is normal. There is no color  Doppler evidence of an atrial shunt.     Mitral Valve  Mitral valve leaflets appear normal. There is no evidence of mitral stenosis  or clinically significant mitral regurgitation. There is trace mitral  regurgitation.     Tricuspid Valve  Right ventricle systolic pressure estimate normal.     Aortic Valve  The aortic valve is trileaflet with aortic valve sclerosis. There is trace to  mild aortic regurgitation.      Pulmonic Valve  The pulmonic valve is not well seen, but is grossly normal. This degree of  valvular regurgitation is within normal limits.     Vessels  The aorta root is normal. Normal size ascending aorta. IVC diameter <2.1 cm  collapsing >50% with sniff suggests a normal RA pressure of 3 mmHg.     Pericardium  There is no pericardial effusion.     ______________________________________________________________________________  MMode/2D Measurements & Calculations  IVSd: 1.0 cm  LVIDd: 4.3 cm  LVIDs: 2.8 cm  LVPWd: 0.82 cm  FS: 36.5 %  LV mass(C)d: 128.8 grams  LV mass(C)dI: 76.2 grams/m2     Ao root diam: 2.7 cm  LA dimension: 3.1 cm  LA/Ao: 1.1  LVOT diam: 2.0 cm  LVOT area: 3.1 cm2  LA Volume Indexed (AL/bp): 21.9 ml/m2  RWT: 0.38  TAPSE: 2.2 cm     Time Measurements  MM HR: 76.0 BPM     Doppler Measurements & Calculations  MV E max solo: 48.4 cm/sec  MV A max solo: 69.4 cm/sec  MV E/A: 0.70  MV dec time: 0.37 sec  LV V1 max PG: 3.2 mmHg  LV V1 max: 89.5 cm/sec  LV V1 VTI: 21.9 cm  SV(LVOT): 67.9 ml  SI(LVOT): 40.2 ml/m2  PA acc time: 0.09 sec  E/E' av.3  Lateral E/e': 8.9  Medial E/e': 9.7     ______________________________________________________________________________  Report approved by: Clark Giordano 10/04/2021 02:28 PM

## 2021-10-04 NOTE — PROGRESS NOTES
"PRIMARY DIAGNOSIS: \"GENERIC\" NURSING  OUTPATIENT/OBSERVATION GOALS TO BE MET BEFORE DISCHARGE:  1. ADLs back to baseline: Yes    2. Activity and level of assistance: Up with standby assistance.    3. Pain status: Pain free.    4. Return to near baseline physical activity: Yes     Discharge Planner Nurse   Safe discharge environment identified: Yes  Barriers to discharge: Yes       Entered by: Malika Singh 10/04/2021 3:42 PM     Please review provider order for any additional goals.   Nurse to notify provider when observation goals have been met and patient is ready for discharge.  "

## 2021-10-04 NOTE — PLAN OF CARE
"PRIMARY DIAGNOSIS: \"GENERIC\" NURSING  OUTPATIENT/OBSERVATION GOALS TO BE MET BEFORE DISCHARGE:  ADLs back to baseline: Yes    Activity and level of assistance: Up with standby assistance.    Pain status: Pain free.    Return to near baseline physical activity: Yes     Discharge Planner Nurse   Safe discharge environment identified: Yes  Barriers to discharge: Yes       Entered by: Malika Singh 10/04/2021 3:41 PM     Please review provider order for any additional goals.   Nurse to notify provider when observation goals have been met and patient is ready for discharge.  "

## 2021-10-04 NOTE — PROGRESS NOTES
PRIMARY DIAGNOSIS: GENERALIZED WEAKNESS    OUTPATIENT/OBSERVATION GOALS TO BE MET BEFORE DISCHARGE  1. Orthostatic performed: N/A    2. Tolerating PO medications: Yes    3. Return to near baseline physical activity: Yes    4. Cleared for discharge by consultants (if involved): No    Discharge Planner Nurse   Safe discharge environment identified: Yes  Barriers to discharge: Yes.  Pt to have an Echo. Monitoring for arrhythmias.        Entered by: Viola Greenwood 10/04/2021 2:21 AM     Please review provider order for any additional goals.   Nurse to notify provider when observation goals have been met and patient is ready for discharge.

## 2021-10-04 NOTE — PROGRESS NOTES
[facility]      OUTPATIENT PHYSICAL THERAPY EVALUATION  PLAN OF TREATMENT FOR OUTPATIENT REHABILITATION  (COMPLETE FOR INITIAL CLAIMS ONLY)  Patient's Last Name, First Name, M.I.  YOB: 1931  TequilaNae MOSS                        Provider's Name  [facility] Medical Record No.  [id number]                               Onset Date:  10/03/21   Start of Care Date:  10/04/21      Type:     _X_PT   ___OT   ___SLP Medical Diagnosis:  generalized weakness                        PT Diagnosis:  impaired functional mobility   Visits from SOC:  1   _________________________________________________________________________________  Plan of Treatment/Functional Goals    Planned Interventions: balance training, bed mobility training, gait training, home exercise program, strengthening, transfer training     Goals: See Physical Therapy Goals on Care Plan in [employer] electronic health record.    Therapy Frequency: Daily  Predicted Duration of Therapy Intervention: 3 days  _________________________________________________________________________________    I CERTIFY THE NEED FOR THESE SERVICES FURNISHED UNDER        THIS PLAN OF TREATMENT AND WHILE UNDER MY CARE     (Physician co-signature of this document indicates review and certification of the therapy plan).                Certification date from: 10/04/21, Certification date to: 10/11/21    Referring Physician: Chad Lopez MD            Initial Assessment        See Physical Therapy evaluation dated 10/04/21 in Epic electronic health record.

## 2021-10-04 NOTE — PROGRESS NOTES
10/04/21 1000   Quick Adds   Quick Adds Certification   Type of Visit Initial PT Evaluation   Living Environment   People in home spouse   Current Living Arrangements independent living facility   Home Accessibility no concerns   Transportation Anticipated family or friend will provide   Self-Care   Usual Activity Tolerance good   Current Activity Tolerance fair   Regular Exercise No   Equipment Currently Used at Home cane, straight;walker, rolling;grab bar, toilet;grab bar, tub/shower;raised toilet seat;shower chair   General Information   Onset of Illness/Injury or Date of Surgery 10/03/21   Referring Physician Salvador Lopez MD   Patient/Family Therapy Goals Statement (PT) return home   Pertinent History of Current Problem (include personal factors and/or comorbidities that impact the POC) HTN and HLD who presents with several hours of generalized weakness without clear infectious or metabolic cause   Existing Precautions/Restrictions no known precautions/restrictions   Pain Assessment   Patient Currently in Pain No   Range of Motion (ROM)   ROM Quick Adds ROM WFL   Strength   Manual Muscle Testing Quick Adds Strength WFL   Bed Mobility   Bed Mobility supine-sit   Supine-Sit [place] (Bed Mobility) supervision;verbal cues   Assistive Device (Bed Mobility) bed rails   Comment (Bed Mobility) HOB flat, slight use of bed rail.    Transfers   Transfers sit-stand transfer   Sit-Stand Transfer   Sit-Stand [place] (Transfers) supervision;verbal cues   Assistive Device (Sit-Stand Transfers) walker, 4-wheeled   Sit/Stand Transfer Comments cues for safety with use of walker   Gait/Stairs (Locomotion)   [place] Level (Gait) supervision;contact guard   Assistive Device (Gait) walker, 4-wheeled   Distance in Feet (Required for LE Total Joints) 200'   Comment (Gait/Stairs) flexed posture, cues for upright. good pace   Clinical Impression   Criteria for Skilled Therapeutic Intervention yes, treatment indicated    PT Diagnosis (PT) impaired functional mobility   Influenced by the following impairments gait   Functional limitations due to impairments decreased endurance   Clinical Presentation Stable/Uncomplicated   Clinical Presentation Rationale pt presents as medically diagnosed   Clinical Decision Making (Complexity) low complexity   Therapy Frequency (PT) Daily   Predicted Duration of Therapy Intervention (days/wks) 3 days   Planned Therapy Interventions (PT) balance training;bed mobility training;gait training;home exercise program;strengthening;transfer training   Anticipated Equipment Needs at Discharge (PT) walker, rolling  (owns 4WW)   Risk & Benefits of therapy have been explained evaluation/treatment results reviewed;patient   PT Discharge Planning    PT Discharge Recommendation (DC Rec) home with assist   PT Rationale for DC Rec assist from spouse as needed   Therapy Certification   Start of care date 10/04/21   Certification date from 10/04/21   Certification date to 10/11/21   Medical Diagnosis generalized weakness   Total Evaluation Time   Total Evaluation Time (Minutes) 10

## 2021-10-04 NOTE — ED NOTES
Called and updated pt's daughter Connie. She will be the primary visitor and can be contacted on her cell phone with any updates or questions. (listed on face sheet).

## 2021-10-04 NOTE — PHARMACY-ADMISSION MEDICATION HISTORY
Pharmacy Note - Admission Medication History    Pertinent Provider Information: none     ______________________________________________________________________    Prior To Admission (PTA) med list completed and updated in EMR.       PTA Med List   Medication Sig Last Dose     aspirin 81 MG EC tablet Take 81 mg by mouth daily 10/3/2021 at Unknown time     atorvastatin (LIPITOR) 20 MG tablet [ATORVASTATIN (LIPITOR) 20 MG TABLET] Take 20 mg by mouth bedtime. 10/2/2021 at Unknown time     cephalexin (KEFLEX) 250 MG capsule Take 1,000 mg by mouth continuous prn (prior to dental appointment)  Unknown at Unknown time     dorzolamide (TRUSOPT) 2 % ophthalmic solution Place 1 drop into both eyes 2 times daily 10/3/2021 at am     fish oil-omega-3 fatty acids 1000 MG capsule Take 1 g by mouth daily 10/3/2021 at Unknown time     ibuprofen (ADVIL/MOTRIN) 200 MG tablet Take 400 mg by mouth every 8 hours as needed for mild pain Unknown at Unknown time     latanoprost (XALATAN) 0.005 % ophthalmic solution Place 1 drop into both eyes At Bedtime  10/2/2021 at Unknown time     lisinopril (ZESTRIL) 2.5 MG tablet Take 2.5 mg by mouth daily 10/3/2021 at Unknown time     metoprolol succinate ER (TOPROL-XL) 50 MG 24 hr tablet Take 50 mg by mouth daily 10/3/2021 at Unknown time     multivitamin w/minerals (THERA-VIT-M) tablet Take 1 tablet by mouth daily 10/3/2021 at Unknown time     oxybutynin ER (DITROPAN-XL) 5 MG 24 hr tablet Take 5 mg by mouth daily 10/3/2021 at Unknown time     Turmeric 500 MG CAPS Take 500 mg by mouth daily 10/3/2021 at Unknown time       Information source(s): Patient, Clinic records and CareEverywhere/[facility]  Method of interview communication: in-person    Summary of Changes to PTA Med List  New: none  Discontinued: none  Changed: none    Patient was asked about OTC/herbal products specifically.  PTA med list reflects this.    In the past week, patient estimated taking medication this percent of the time:   greater than 90%.    Allergies were reviewed, assessed, and updated with the patient.      Patient did not bring any medications to the hospital and can't retrieve from home. No multi-dose medications are available for use during hospital stay.     The information provided in this note is only as accurate as the sources available at the time of the update(s).    Thank you for the opportunity to participate in the care of this patient.    Sarthak Mixon RPH  10/3/2021 7:57 PM

## 2021-10-04 NOTE — CONSULTS
Care Management Initial Consult    General Information  Assessment completed with: Patient,    Type of CM/SW Visit: Initial Assessment    Primary Care Provider verified and updated as needed:     Readmission within the last 30 days:           Advance Care Planning: Advance Care Planning Reviewed: no concerns identified          Communication Assessment  Patient's communication style: spoken language (English or Bilingual)    Hearing Difficulty or Deaf: yes   Wear Glasses or Blind: yes    Cognitive  Cognitive/Neuro/Behavioral: WDL        Orientation: disoriented to, time             Living Environment:   People in home: spouse     Current living Arrangements: independent living facility      Able to return to prior arrangements: yes       Family/Social Support:  Care provided by: self  Provides care for: no one  Marital Status:             Description of Support System: Supportive         Current Resources:   Patient receiving home care services:       Community Resources:    Equipment currently used at home: cane, straight, walker, rolling, grab bar, toilet, grab bar, tub/shower, raised toilet seat, shower chair  Supplies currently used at home:      Employment/Financial:  Employment Status:          Financial Concerns:             Lifestyle & Psychosocial Needs:  Social Determinants of Health     Tobacco Use: Unknown     Smoking Tobacco Use: Never Smoker     Smokeless Tobacco Use: Unknown   Alcohol Use:      Frequency of Alcohol Consumption:      Average Number of Drinks:      Frequency of Binge Drinking:    Financial Resource Strain:      Difficulty of Paying Living Expenses:    Food Insecurity:      Worried About Running Out of Food in the Last Year:      Ran Out of Food in the Last Year:    Transportation Needs:      Lack of Transportation (Medical):      Lack of Transportation (Non-Medical):    Physical Activity:      Days of Exercise per Week:      Minutes of Exercise per Session:    Stress:       Feeling of Stress :    Social Connections:      Frequency of Communication with Friends and Family:      Frequency of Social Gatherings with Friends and Family:      Attends Restoration Services:      Active Member of Clubs or Organizations:      Attends Club or Organization Meetings:      Marital Status:    Intimate Partner Violence:      Fear of Current or Ex-Partner:      Emotionally Abused:      Physically Abused:      Sexually Abused:    Depression:      PHQ-2 Score:    Housing Stability:      Unable to Pay for Housing in the Last Year:      Number of Places Lived in the Last Year:      Unstable Housing in the Last Year:        Functional Status:  Prior to admission patient needed assistance:   Dependent ADLs:: Ambulation-cane  Dependent IADLs:: Independent                 Additional Information:  Met with patient for assessment. Patient states she lives independently in her apartment with spouse. She has a cane and walker. Family willing to transport.      Komal Olsen RN

## 2021-10-04 NOTE — ED NOTES
[facility] ED Handoff Report    ED Chief Complaint: weakness    ED Diagnosis:  (R53.1) Generalized weakness  Comment:   Plan:        PMH:  No past medical history on file.     Code Status:  No Order     Falls Risk: Yes Band: Applied    Current Living Situation/Residence: lives with a significant other     Elimination Status: Continent: Yes     Activity Level: SBA w/ walker-independent at home but has been bedrest r/t to weakness- purewick in place here    Patients Preferred Language:  English     Needed: No    Vital Signs:  BP (!) 169/74   Pulse 74   Temp 98.3  F (36.8  C) (Oral)   Resp 18   SpO2 95%      Cardiac Rhythm: NSR    Pain Score: 0/10    Is the Patient Confused:  Yes- pt is oriented to self but states it is sept 2022. Pt educated on POC but then forgets.     Last Food or Drink: 10/03/21     Focused Assessment:  Pt denies pain or other complaints. Pt c/o generalized weakness started 2 hours prior to arrival.     Tests Performed: Done: Labs    Treatments Provided:  No meds given in ER     Family Dynamics/Concerns: No    Family Updated On Visitor Policy: Yes    Plan of Care Communicated to Family: Yes    Who Was Updated about Plan of Care: daughter    Belongings Checklist Done and Signed by Patient: Yes    Covid: asymptomatic , negative    Additional Information: n/a    RN: Nayana Che 10/3/2021 8:46 PM

## 2021-10-05 ENCOUNTER — APPOINTMENT (OUTPATIENT)
Dept: NEUROLOGY | Facility: HOSPITAL | Age: 86
End: 2021-10-05
Attending: PSYCHIATRY & NEUROLOGY
Payer: COMMERCIAL

## 2021-10-05 ENCOUNTER — APPOINTMENT (OUTPATIENT)
Dept: MRI IMAGING | Facility: HOSPITAL | Age: 86
End: 2021-10-05
Attending: PSYCHIATRY & NEUROLOGY
Payer: COMMERCIAL

## 2021-10-05 ENCOUNTER — APPOINTMENT (OUTPATIENT)
Dept: CT IMAGING | Facility: HOSPITAL | Age: 86
End: 2021-10-05
Attending: PSYCHIATRY & NEUROLOGY
Payer: COMMERCIAL

## 2021-10-05 ENCOUNTER — APPOINTMENT (OUTPATIENT)
Dept: OCCUPATIONAL THERAPY | Facility: HOSPITAL | Age: 86
End: 2021-10-05
Payer: COMMERCIAL

## 2021-10-05 VITALS
RESPIRATION RATE: 17 BRPM | OXYGEN SATURATION: 95 % | DIASTOLIC BLOOD PRESSURE: 67 MMHG | HEART RATE: 68 BPM | BODY MASS INDEX: 28.9 KG/M2 | HEIGHT: 61 IN | SYSTOLIC BLOOD PRESSURE: 127 MMHG | WEIGHT: 153.1 LBS | TEMPERATURE: 98.1 F

## 2021-10-05 PROBLEM — E04.2 MULTIPLE THYROID NODULES: Status: ACTIVE | Noted: 2021-10-05

## 2021-10-05 LAB
ANION GAP SERPL CALCULATED.3IONS-SCNC: 6 MMOL/L (ref 5–18)
ATRIAL RATE - MUSE: 72 BPM
BUN SERPL-MCNC: 16 MG/DL (ref 8–28)
CALCIUM SERPL-MCNC: 9.6 MG/DL (ref 8.5–10.5)
CHLORIDE BLD-SCNC: 106 MMOL/L (ref 98–107)
CO2 SERPL-SCNC: 30 MMOL/L (ref 22–31)
CREAT SERPL-MCNC: 0.71 MG/DL (ref 0.6–1.1)
DIASTOLIC BLOOD PRESSURE - MUSE: 98 MMHG
ERYTHROCYTE [DISTWIDTH] IN BLOOD BY AUTOMATED COUNT: 12 % (ref 10–15)
GFR SERPL CREATININE-BSD FRML MDRD: 76 ML/MIN/1.73M2
GLUCOSE BLD-MCNC: 116 MG/DL (ref 70–125)
HCT VFR BLD AUTO: 36.8 % (ref 35–47)
HGB BLD-MCNC: 12.1 G/DL (ref 11.7–15.7)
INTERPRETATION ECG - MUSE: NORMAL
MAGNESIUM SERPL-MCNC: 1.9 MG/DL (ref 1.8–2.6)
MCH RBC QN AUTO: 31.4 PG (ref 26.5–33)
MCHC RBC AUTO-ENTMCNC: 32.9 G/DL (ref 31.5–36.5)
MCV RBC AUTO: 96 FL (ref 78–100)
P AXIS - MUSE: 54 DEGREES
PLATELET # BLD AUTO: 144 10E3/UL (ref 150–450)
POTASSIUM BLD-SCNC: 3.8 MMOL/L (ref 3.5–5)
PR INTERVAL - MUSE: 180 MS
QRS DURATION - MUSE: 90 MS
QT - MUSE: 416 MS
QTC - MUSE: 455 MS
R AXIS - MUSE: 36 DEGREES
RBC # BLD AUTO: 3.85 10E6/UL (ref 3.8–5.2)
SODIUM SERPL-SCNC: 142 MMOL/L (ref 136–145)
SYSTOLIC BLOOD PRESSURE - MUSE: 202 MMHG
T AXIS - MUSE: 29 DEGREES
VENTRICULAR RATE- MUSE: 72 BPM
WBC # BLD AUTO: 4.1 10E3/UL (ref 4–11)

## 2021-10-05 PROCEDURE — 97535 SELF CARE MNGMENT TRAINING: CPT | Mod: GO

## 2021-10-05 PROCEDURE — 250N000011 HC RX IP 250 OP 636: Performed by: FAMILY MEDICINE

## 2021-10-05 PROCEDURE — 99215 OFFICE O/P EST HI 40 MIN: CPT | Performed by: PSYCHIATRY & NEUROLOGY

## 2021-10-05 PROCEDURE — 83519 RIA NONANTIBODY: CPT | Mod: 59 | Performed by: PSYCHIATRY & NEUROLOGY

## 2021-10-05 PROCEDURE — 83519 RIA NONANTIBODY: CPT | Performed by: PSYCHIATRY & NEUROLOGY

## 2021-10-05 PROCEDURE — 36415 COLL VENOUS BLD VENIPUNCTURE: CPT | Performed by: HOSPITALIST

## 2021-10-05 PROCEDURE — A9585 GADOBUTROL INJECTION: HCPCS | Performed by: FAMILY MEDICINE

## 2021-10-05 PROCEDURE — 83735 ASSAY OF MAGNESIUM: CPT | Performed by: HOSPITALIST

## 2021-10-05 PROCEDURE — 99217 PR OBSERVATION CARE DISCHARGE: CPT | Performed by: FAMILY MEDICINE

## 2021-10-05 PROCEDURE — 250N000013 HC RX MED GY IP 250 OP 250 PS 637: Performed by: HOSPITALIST

## 2021-10-05 PROCEDURE — G0378 HOSPITAL OBSERVATION PER HR: HCPCS

## 2021-10-05 PROCEDURE — 85027 COMPLETE CBC AUTOMATED: CPT | Performed by: HOSPITALIST

## 2021-10-05 PROCEDURE — 72156 MRI NECK SPINE W/O & W/DYE: CPT

## 2021-10-05 PROCEDURE — 95816 EEG AWAKE AND DROWSY: CPT | Mod: 26 | Performed by: PSYCHIATRY & NEUROLOGY

## 2021-10-05 PROCEDURE — 70496 CT ANGIOGRAPHY HEAD: CPT

## 2021-10-05 PROCEDURE — 80048 BASIC METABOLIC PNL TOTAL CA: CPT | Performed by: HOSPITALIST

## 2021-10-05 PROCEDURE — 255N000002 HC RX 255 OP 636: Performed by: FAMILY MEDICINE

## 2021-10-05 PROCEDURE — 95819 EEG AWAKE AND ASLEEP: CPT

## 2021-10-05 RX ORDER — GADOBUTROL 604.72 MG/ML
7 INJECTION INTRAVENOUS ONCE
Status: COMPLETED | OUTPATIENT
Start: 2021-10-05 | End: 2021-10-05

## 2021-10-05 RX ORDER — IOPAMIDOL 755 MG/ML
75 INJECTION, SOLUTION INTRAVASCULAR ONCE
Status: COMPLETED | OUTPATIENT
Start: 2021-10-05 | End: 2021-10-05

## 2021-10-05 RX ADMIN — ASPIRIN 81 MG: 81 TABLET, COATED ORAL at 10:30

## 2021-10-05 RX ADMIN — OXYBUTYNIN CHLORIDE 5 MG: 5 TABLET, FILM COATED, EXTENDED RELEASE ORAL at 10:30

## 2021-10-05 RX ADMIN — LISINOPRIL 2.5 MG: 2.5 TABLET ORAL at 10:30

## 2021-10-05 RX ADMIN — MULTIPLE VITAMINS W/ MINERALS TAB 1 TABLET: TAB at 10:30

## 2021-10-05 RX ADMIN — DORZOLAMIDE HCL 1 DROP: 20 SOLUTION/ DROPS OPHTHALMIC at 10:29

## 2021-10-05 RX ADMIN — IOPAMIDOL 75 ML: 755 INJECTION, SOLUTION INTRAVENOUS at 08:57

## 2021-10-05 RX ADMIN — GADOBUTROL 7 ML: 604.72 INJECTION INTRAVENOUS at 08:19

## 2021-10-05 RX ADMIN — METOPROLOL SUCCINATE 50 MG: 25 TABLET, EXTENDED RELEASE ORAL at 10:30

## 2021-10-05 NOTE — PLAN OF CARE
PRIMARY DIAGNOSIS: GENERALIZED WEAKNESS    OUTPATIENT/OBSERVATION GOALS TO BE MET BEFORE DISCHARGE  1. Orthostatic performed: No asked N.A. to do it-was not done. Told nights    2. Tolerating PO medications: Yes    3. Return to near baseline physical activity: Yes    4. Cleared for discharge by consultants (if involved): No    Discharge Planner Nurse   Safe discharge environment identified: Yes  Barriers to discharge: No       Entered by: Disha BAUGH Day 10/05/2021 12:25 AM     Please review provider order for any additional goals.   Nurse to notify provider when observation goals have been met and patient is ready for discharge.  Neuro saw tonight-ordered more x-rays and lab.Could possibly go home tomorrow depending on results

## 2021-10-05 NOTE — PROGRESS NOTES
Care Management Discharge Note    Discharge Date: 10/05/2021       Discharge Disposition: Home    Discharge Services: None    Discharge DME:      Discharge Transportation: family or friend will provide    Education Provided on the Discharge Plan:  yes  Persons Notified of Discharge Plans: yes      Patient/Family in Agreement with the Plan:  yes      Additional Information:  Patient to discharge home with support from spouse. Spouse to transport.         Komal Olsen RN

## 2021-10-05 NOTE — PLAN OF CARE
"PRIMARY DIAGNOSIS: \"GENERIC\" NURSING  OUTPATIENT/OBSERVATION GOALS TO BE MET BEFORE DISCHARGE:  ADLs back to baseline: No    Activity and level of assistance: Up with standby assistance.    Pain status: Pain free.    Return to near baseline physical activity: No     Discharge Planner Nurse   Safe discharge environment identified: Yes  Barriers to discharge: Yes pt up with assist of 1  BP (!) 177/76 (BP Location: Right arm)   Pulse 65   Temp 97.8  F (36.6  C) (Oral)   Resp 20   Ht 1.549 m (5' 1\")   Wt 69.4 kg (153 lb 1.6 oz)   SpO2 96%   BMI 28.93 kg/m     Tele NSR       Entered by: BRAXTON CHAVIS 10/05/2021 5:57 AM     Please review provider order for any additional goals.   Nurse to notify provider when observation goals have been met and patient is ready for discharge.  "

## 2021-10-05 NOTE — PLAN OF CARE
"PRIMARY DIAGNOSIS: \"GENERIC\" NURSING  OUTPATIENT/OBSERVATION GOALS TO BE MET BEFORE DISCHARGE:  ADLs back to baseline: No    Activity and level of assistance: Up with standby assistance.    Pain status: Pain free.    Return to near baseline physical activity: No     Discharge Planner Nurse   Safe discharge environment identified: Yes  Barriers to discharge: Yes       Entered by: BRAXTON CHAVIS 10/05/2021 6:01 AM     Please review provider order for any additional goals.   Nurse to notify provider when observation goals have been met and patient is ready for discharge.  "

## 2021-10-05 NOTE — PLAN OF CARE
"PRIMARY DIAGNOSIS: \"GENERIC\" NURSING  OUTPATIENT/OBSERVATION GOALS TO BE MET BEFORE DISCHARGE:  ADLs back to baseline: Yes    Activity and level of assistance: Ambulating independently.    Pain status: Pain free.    Return to near baseline physical activity: Yes       Discharge Planner Nurse   Safe discharge environment identified:   Barriers to discharge: No       Entered by: Malika Singh 10/05/2021 2:51 PM     Please review provider order for any additional goals.   Nurse to notify provider when observation goals have been met and patient is ready for discharge.  "

## 2021-10-05 NOTE — DISCHARGE INSTRUCTIONS
Follow up in Neurology in ONE MONTH [phone].  You saw Dr Alcocer while at [facility].

## 2021-10-05 NOTE — PROGRESS NOTES
Bedside EEG was performed that included photic stimulation; hyperventilation was deferred. The patient was awake and asleep throughout the recording.  EEG # [id number]. Skin is intact.   EEG [id number] system was used for this recording.

## 2021-10-05 NOTE — CONSULTS
NEUROLOGY CONSULTATION NOTE     Nae Mandel,  1931, MRN [id number] Date: 10/4/2021     [facility]   Code status:  Full Code   PCP: Nora Esteves, [phone]      ASSESSMENT & PLAN   Diagnosis code: Spell of generalized weakness    Spell of generalized weakness  History of hypertension    MRI brain negative for acute structural lesions  CT angiogram head and neck to rule out any significant stenosis  Echocardiogram with normal ejection fraction  Consider event monitor if she has recurrent spells  TSH/B12 normal  EEG  MRI C-spine  Myasthenia gravis panel  Consideration for hypo-/hyperkalemic periodic paralysis.  If patient has a another spell check potassium during the spell.  PT/OT    Discharge:- Patient can be discharged from neurology and if above work-up is negative.  Above work-up could be done as an outpatient.       Chief Complaint   Patient presents with     Fatigue        HISTORY OF PRESENT ILLNESS     We have been requested by Dr. Singletary to evaluate Nae Mandel who is a 89 year old  female for spell of generalized weakness.  Patient reports that the spell happened yesterday in the evening when she was trying to work in the kitchen.  She all of a sudden felt weak all over.  She reports her cognition was intact.  She tried to call for help and then sit down for a few minutes with no improvement in her symptoms.  She waited for a few minutes and then decided to come to the emergency room.  Symptoms lasted about 30 minutes.  Reports no associated symptoms.  No chest pains palpitations or headaches.  Denies feeling dizzy or lightheaded.  Has had plenty of water that day.  Has not had similar symptoms in the past.  Does take aspirin at baseline.  In the emergency room MRI was done which was negative for stroke.  She was then admitted to the hospital for cardiac work-up.  Neurology has been consulted to look for other causes of her spell.  Does take lisinopril at baseline.  Blood  pressure medication is not new for her.  She is recovered from a spell and reports no ongoing symptoms.     PAST MEDICAL & SURGICAL HISTORY     Medical History  Significant for high blood pressure.  Surgical History  Past Surgical History:   Procedure Laterality Date     ARTHROPLASTY KNEE       OTHER SURGICAL HISTORY      Bilateral hip arthroplasty        SOCIAL HISTORY     Social History     Tobacco Use     Smoking status: Never Smoker   Substance Use Topics     Alcohol use: No     Drug use: No          FAMILY HISTORY     Family history negative for seizures or neurological issues.     ALLERGIES     Allergies   Allergen Reactions     Epinephrine Unknown        REVIEW OF SYSTEMS     12 system ROS was done other than the HPI this was negative.     HOME & HOSPITAL MEDICATIONS     Prior to Admission Medications  Medications Prior to Admission   Medication Sig Dispense Refill Last Dose     aspirin 81 MG EC tablet Take 81 mg by mouth daily   10/3/2021 at Unknown time     atorvastatin (LIPITOR) 20 MG tablet [ATORVASTATIN (LIPITOR) 20 MG TABLET] Take 20 mg by mouth bedtime.   10/2/2021 at Unknown time     cephalexin (KEFLEX) 250 MG capsule Take 1,000 mg by mouth continuous prn (prior to dental appointment)    Unknown at Unknown time     dorzolamide (TRUSOPT) 2 % ophthalmic solution Place 1 drop into both eyes 2 times daily   10/3/2021 at am     fish oil-omega-3 fatty acids 1000 MG capsule Take 1 g by mouth daily   10/3/2021 at Unknown time     ibuprofen (ADVIL/MOTRIN) 200 MG tablet Take 400 mg by mouth every 8 hours as needed for mild pain   Unknown at Unknown time     latanoprost (XALATAN) 0.005 % ophthalmic solution Place 1 drop into both eyes At Bedtime    10/2/2021 at Unknown time     lisinopril (ZESTRIL) 2.5 MG tablet Take 2.5 mg by mouth daily   10/3/2021 at Unknown time     metoprolol succinate ER (TOPROL-XL) 50 MG 24 hr tablet Take 50 mg by mouth daily   10/3/2021 at Unknown time     multivitamin w/minerals  "(THERA-VIT-M) tablet Take 1 tablet by mouth daily   10/3/2021 at Unknown time     oxybutynin ER (DITROPAN-XL) 5 MG 24 hr tablet Take 5 mg by mouth daily   10/3/2021 at Unknown time     Turmeric 500 MG CAPS Take 500 mg by mouth daily   10/3/2021 at Unknown time       Hospital Medications    aspirin  81 mg Oral Daily     atorvastatin  20 mg Oral At Bedtime     dorzolamide  1 drop Both Eyes BID     enoxaparin ANTICOAGULANT  40 mg Subcutaneous Q24H     latanoprost  1 drop Both Eyes At Bedtime     lisinopril  2.5 mg Oral Daily     metoprolol succinate ER  50 mg Oral Daily     multivitamin w/minerals  1 tablet Oral Daily     oxybutynin ER  5 mg Oral Daily     sodium chloride (PF)  3 mL Intracatheter Q8H        PHYSICAL EXAM     Vital signs  Temp:  [98  F (36.7  C)-98.5  F (36.9  C)] 98.2  F (36.8  C)  Pulse:  [70-80] 80  Resp:  [16-20] 18  BP: (126-185)/(61-88) 142/72  SpO2:  [95 %-96 %] 96 %    PHYSICAL EXAMINATION  VITALS: BP (!) 142/72 (BP Location: Right arm)   Pulse 80   Temp 98.2  F (36.8  C) (Oral)   Resp 18   Ht 1.549 m (5' 1\")   Wt 69.4 kg (153 lb 1.6 oz)   SpO2 96%   BMI 28.93 kg/m    GENERAL -Health appearing, No apparent distress  EYES- No scleral icterus, no eyelid droop, Pupils - see Neuro section  HEENT - Normocephalic, atraumatic, Hearing grossly intact; Oral mucosa moist and pink in color. External Ears and nose intact.   Neck - supple with no obvious lymphadenopathy or thyromegaly  PULM - Good spontaneous respiratory effort; Normal palpation of chest.   CV- Pedal pulses present with no peripheral edema/ No significant varicosities.  MSK- Gait - see Neuro section; Strength and tone- see Neuro section; Range of motion grossly intact.  PSYCH -cooperative.    Neurological  Mental status - Patient is awake and oriented to self, place and time. Attention span is normal. Language is fluent and follows commands appropriately.   Cranial nerves - CN II-XII intact. Pupils are reactive and symmetric; EOMI, " VFIT, NLF symmetric  Motor - There is no pronator drift. Motor exam shows 5/5 strength in all extremities.  Tone - Tone is symmetric bilaterally in upper and lower extremities.  Reflexes - Reflexes are symmetric and 1+  Sensation - Sensory exam is grossly intact to light touch, pain.   Coordination - Finger to nose and heel to shin is without dysmetria.  Gait and station --able to ambulate.  Formal gait testing cannot be done due to safety concerns from ongoing medical issues.       DIAGNOSTIC STUDIES     Pertinent Radiology   MRI brain - images reviewed     1. No evidence of acute intracranial abnormality. No evidence of hemorrhage, mass or acute infarct.     2. Nonspecific white matter disease, most commonly due to chronic microvascular ischemia in a patient of this age.    ECHO  Left ventricular size, wall motion and function are normal. The ejection  fraction is 60-65%.  Normal right ventricle size and systolic function.  IVC diameter <2.1 cm collapsing >50% with sniff suggests a normal RA pressure  of 3 mmHg.  No hemodynamically significant valvular abnormalities on 2D or color flow  imaging.    Recent Results (from the past 24 hour(s))   Basic metabolic panel    Collection Time: 10/04/21  4:48 AM   Result Value Ref Range    Sodium 142 136 - 145 mmol/L    Potassium 3.8 3.5 - 5.0 mmol/L    Chloride 110 (H) 98 - 107 mmol/L    Carbon Dioxide (CO2) 24 22 - 31 mmol/L    Anion Gap 8 5 - 18 mmol/L    Urea Nitrogen 18 8 - 28 mg/dL    Creatinine 0.67 0.60 - 1.10 mg/dL    Calcium 9.5 8.5 - 10.5 mg/dL    Glucose 100 70 - 125 mg/dL    GFR Estimate 78 >60 mL/min/1.73m2   CBC with platelets    Collection Time: 10/04/21  4:48 AM   Result Value Ref Range    WBC Count 4.7 4.0 - 11.0 10e3/uL    RBC Count 3.76 (L) 3.80 - 5.20 10e6/uL    Hemoglobin 11.7 11.7 - 15.7 g/dL    Hematocrit 35.8 35.0 - 47.0 %    MCV 95 78 - 100 fL    MCH 31.1 26.5 - 33.0 pg    MCHC 32.7 31.5 - 36.5 g/dL    RDW 12.0 10.0 - 15.0 %    Platelet Count 145 (L)  150 - 450 10e3/uL   Troponin I    Collection Time: 10/04/21  4:48 AM   Result Value Ref Range    Troponin I 0.01 0.00 - 0.29 ng/mL   Magnesium    Collection Time: 10/04/21  4:48 AM   Result Value Ref Range    Magnesium 2.1 1.8 - 2.6 mg/dL   CK total    Collection Time: 10/04/21  4:48 AM   Result Value Ref Range    CK 86 30 - 190 U/L   TSH with free T4 reflex    Collection Time: 10/04/21  4:48 AM   Result Value Ref Range    TSH 0.64 0.30 - 5.00 uIU/mL   Echocardiogram Complete    Collection Time: 10/04/21  1:50 PM   Result Value Ref Range    LVEF  60-65%        Total time spent for face to face visit, reviewing labs/imaging studies, counseling and coordination of care was: Over 70 min More than 50% of this time was spent on counseling and coordination of care.    Counseling patient.  Ordering multiple tests.  Coordination of care with the primary team.  Reviewing test results with the patient.    Luis Fernando Alcocer MD  Neurologist  [facility] Neurology [facility]  Tel:- [phone]

## 2021-10-05 NOTE — PLAN OF CARE
Physical Therapy Discharge Summary    Reason for therapy discharge:    Discharged to home.    Progress towards therapy goal(s). See goals on Care Plan in [facility] electronic health record for goal details.  Goals not met.  Barriers to achieving goals:   discharge from facility.    Therapy recommendation(s):    No further therapy is recommended.    Rosy Walls, PT  10/5/2021

## 2021-10-05 NOTE — PROGRESS NOTES
NEUROLOGY PROGRESS NOTE     Nae Mandel,  1931, MRN [id number] Date: 10/5/2021     [facility]   Code status:  Full Code   PCP: Nora Esteves, [phone]      ASSESSMENT & PLAN   Diagnosis code: Spell of generalized weakness    Spell of generalized weakness  History of hypertension    MRI brain negative for acute structural lesions  CT angiogram head and neck negative for any large vessel occlusion/stenosis  Echocardiogram with normal ejection fraction  Consider event monitor if she has recurrent spells  TSH/B12 normal  EEG negative for epilepsy  MRI C-spine negative for significant spinal stenosis  Myasthenia gravis panel-pending  Consideration for hypo-/hyperkalemic periodic paralysis.  If patient has a another spell check potassium during the spell.  PT/OT    Discharge:-Okay to discharge from neurology end.  If patient she has more spells she should follow-up in neurology [phone].       Chief Complaint   Patient presents with     Fatigue        HISTORY OF PRESENT ILLNESS     We have been requested by Dr. Singletary to evaluate Nae Mandel who is a 89 year old  female for spell of generalized weakness.  Patient reports that the spell happened yesterday in the evening when she was trying to work in the kitchen.  She all of a sudden felt weak all over.  She reports her cognition was intact.  She tried to call for help and then sit down for a few minutes with no improvement in her symptoms.  She waited for a few minutes and then decided to come to the emergency room.  Symptoms lasted about 30 minutes.  Reports no associated symptoms.  No chest pains palpitations or headaches.  Denies feeling dizzy or lightheaded.  Has had plenty of water that day.  Has not had similar symptoms in the past.  Does take aspirin at baseline.  In the emergency room MRI was done which was negative for stroke.  She was then admitted to the hospital for cardiac work-up.  Neurology has been consulted  to look for other causes of her spell.  Does take lisinopril at baseline.  Blood pressure medication is not new for her.  She is recovered from a spell and reports no ongoing symptoms.    10/5/21  Patient reports no recurrence of spells.  Denies feeling dizzy or weak today.  Eager to leave today.  Discussed test results with her.  Discussed with the spells might be.  Discussed about checking potassium if she has a spell again.  Encouraged her to follow-up in clinic if she has another 1 of these attacks.     PAST MEDICAL & SURGICAL HISTORY     Medical History  Significant for high blood pressure.  Surgical History  Past Surgical History:   Procedure Laterality Date     ARTHROPLASTY KNEE       OTHER SURGICAL HISTORY      Bilateral hip arthroplasty        SOCIAL HISTORY     Social History     Tobacco Use     Smoking status: Never Smoker   Substance Use Topics     Alcohol use: No     Drug use: No          FAMILY HISTORY     Family history negative for seizures or neurological issues.     ALLERGIES     Allergies   Allergen Reactions     Epinephrine Unknown        REVIEW OF SYSTEMS     12 system ROS was done other than the HPI this was negative.  No new issues today.     HOME & HOSPITAL MEDICATIONS     Prior to Admission Medications  No medications prior to admission.       Hospital Medications    aspirin  81 mg Oral Daily     atorvastatin  20 mg Oral At Bedtime     dorzolamide  1 drop Both Eyes BID     enoxaparin ANTICOAGULANT  40 mg Subcutaneous Q24H     latanoprost  1 drop Both Eyes At Bedtime     lisinopril  2.5 mg Oral Daily     metoprolol succinate ER  50 mg Oral Daily     multivitamin w/minerals  1 tablet Oral Daily     oxybutynin ER  5 mg Oral Daily     sodium chloride (PF)  3 mL Intracatheter Q8H        PHYSICAL EXAM     Vital signs  Temp:  [97.6  F (36.4  C)-98.4  F (36.9  C)] 98.1  F (36.7  C)  Pulse:  [65-80] 68  Resp:  [17-20] 17  BP: (127-177)/(67-77) 127/67  SpO2:  [94 %-96 %] 95 %    PHYSICAL  "EXAMINATION  VITALS: /67 (BP Location: Right arm)   Pulse 68   Temp 98.1  F (36.7  C) (Oral)   Resp 17   Ht 1.549 m (5' 1\")   Wt 69.4 kg (153 lb 1.6 oz)   SpO2 95%   BMI 28.93 kg/m    GENERAL -Health appearing, No apparent distress  EYES- No scleral icterus, no eyelid droop, Pupils - see Neuro section  HEENT - Normocephalic, atraumatic, Hearing grossly intact; Oral mucosa moist and pink in color. External Ears and nose intact.   Neck - supple with no obvious lymphadenopathy or thyromegaly  PULM - Good spontaneous respiratory effort; Normal palpation of chest.   CV- Pedal pulses present with no peripheral edema/ No significant varicosities.  MSK- Gait - see Neuro section; Strength and tone- see Neuro section; Range of motion grossly intact.  PSYCH -cooperative.    Neurological  Mental status - Patient is awake and oriented to self, place and time. Attention span is normal. Language is fluent and follows commands appropriately.   Cranial nerves - CN II-XII intact. Pupils are reactive and symmetric; EOMI, VFIT, NLF symmetric  Motor - There is no pronator drift. Motor exam shows 5/5 strength in all extremities.  Tone - Tone is symmetric bilaterally in upper and lower extremities.  Coordination - Finger to nose and heel to shin is without dysmetria.  Gait and station --able to ambulate.  Formal gait testing cannot be done due to safety concerns from ongoing medical issues.       DIAGNOSTIC STUDIES     Pertinent Radiology   MRI brain - images reviewed     1. No evidence of acute intracranial abnormality. No evidence of hemorrhage, mass or acute infarct.     2. Nonspecific white matter disease, most commonly due to chronic microvascular ischemia in a patient of this age.    ECHO  Left ventricular size, wall motion and function are normal. The ejection  fraction is 60-65%.  Normal right ventricle size and systolic function.  IVC diameter <2.1 cm collapsing >50% with sniff suggests a normal RA pressure  of 3 " mmHg.  No hemodynamically significant valvular abnormalities on 2D or color flow  Imaging.    MRI C spine  1. Multilevel cervical spondylosis, with findings most notable for multilevel moderate and severe neural foraminal stenosis secondary to facet arthropathy and uncovertebral joint hypertrophy. Severe disc height loss C6-C7. Multilevel disc bulges without   herniation. No high-grade narrowing of the spinal canal, nor spinal cord signal abnormality/myelomalacia.     2. Multiple thyroid nodules bilaterally measuring up to 16 mm in maximum axial dimension. The enlarged left thyroid lobe appears to partially displace the esophagus rightward at the inferior limit of the imaging. Consider further evaluation with thyroid   ultrasound or CT if the patient is experiencing dysphagia.    Head CT  HEAD CT:  1.  No acute intracranial abnormality.     HEAD CTA:   1.  Normal CTA [place].     NECK CTA:  1.  No stenosis or dissection.  2.  Multinodular thyroid gland. Consider dedicated thyroid ultrasound, if not already performed.    EEG  IMPRESSION/REPORT/PLAN  This is a normal EEG during wakefulness and drowsiness except for mild generalized background dysrhythmia. Further clinical correlation is needed.     Please note that the absence of epileptiform abnormalities does not exclude the possibility of epilepsy in any patient.     Recent Results (from the past 24 hour(s))   Basic metabolic panel    Collection Time: 10/05/21  6:22 AM   Result Value Ref Range    Sodium 142 136 - 145 mmol/L    Potassium 3.8 3.5 - 5.0 mmol/L    Chloride 106 98 - 107 mmol/L    Carbon Dioxide (CO2) 30 22 - 31 mmol/L    Anion Gap 6 5 - 18 mmol/L    Urea Nitrogen 16 8 - 28 mg/dL    Creatinine 0.71 0.60 - 1.10 mg/dL    Calcium 9.6 8.5 - 10.5 mg/dL    Glucose 116 70 - 125 mg/dL    GFR Estimate 76 >60 mL/min/1.73m2   CBC with platelets    Collection Time: 10/05/21  6:22 AM   Result Value Ref Range    WBC Count 4.1 4.0 - 11.0 10e3/uL    RBC Count  3.85 3.80 - 5.20 10e6/uL    Hemoglobin 12.1 11.7 - 15.7 g/dL    Hematocrit 36.8 35.0 - 47.0 %    MCV 96 78 - 100 fL    MCH 31.4 26.5 - 33.0 pg    MCHC 32.9 31.5 - 36.5 g/dL    RDW 12.0 10.0 - 15.0 %    Platelet Count 144 (L) 150 - 450 10e3/uL   Magnesium    Collection Time: 10/05/21  6:22 AM   Result Value Ref Range    Magnesium 1.9 1.8 - 2.6 mg/dL       Total time spent for face to face visit, reviewing labs/imaging studies, counseling and coordination of care was: Over 40 min More than 50% of this time was spent on counseling and coordination of care.    Counseling patient.  Reviewing multiple test results with the patient.  Getting EEG.  Patient care with the nursing staff.    Luis Fernando Alcocer MD  Neurologist  [facility] Neurology [facility]  Tel:- [phone]

## 2021-10-05 NOTE — PROGRESS NOTES
Pt discharge home with . They live in an independent senior living complex and supportive family. Follow up with neurology in one month.

## 2021-10-05 NOTE — PLAN OF CARE
PRIMARY DIAGNOSIS: GENERALIZED WEAKNESS    OUTPATIENT/OBSERVATION GOALS TO BE MET BEFORE DISCHARGE  1. Orthostatic performed: N/A    2. Tolerating PO medications: Yes    3. Return to near baseline physical activity: Yes    4. Cleared for discharge by consultants (if involved): No    Discharge Planner Nurse   Safe discharge environment identified: Yes  Barriers to discharge: Yes       Entered by: Disha BAUGH Day 10/05/2021 12:20 AM     Please review provider order for any additional goals.   Nurse to notify provider when observation goals have been met and patient is ready for discharge.

## 2021-10-06 PROBLEM — R41.0 CONFUSION: Status: ACTIVE | Noted: 2021-10-06

## 2021-10-06 PROBLEM — M62.81 GENERALIZED MUSCLE WEAKNESS: Status: ACTIVE | Noted: 2021-10-06

## 2021-10-06 NOTE — DISCHARGE SUMMARY
[facility]  Hospitalist Discharge Summary      Date of Admission:  10/3/2021  Date of Discharge:  10/5/2021  2:27 PM  Discharging Provider: ROBERT ROSALES MD      Discharge Diagnoses   Principal Problem:    Generalized weakness  Active Problems:    Benign hypertension    Multiple thyroid nodules    Generalized muscle weakness    Confusion       Follow-ups Needed After Discharge   Discharge Procedure Orders   Reason for your hospital stay   Order Comments: weakness     Follow-up and recommended labs and tests    Order Comments: Follow up with primary care provider, Nora Esteves, within 7 days for hospital follow- up.  The following labs/tests are recommended: Thyroid ultrasound.     Activity   Order Comments: Your activity upon discharge: activity as tolerated     Order Specific Question Answer Comments   Is discharge order? Yes      Discharge Instructions   Order Comments: Neurology in 1 month     Diet   Order Comments: Follow this diet upon discharge: Orders Placed This Encounter      Combination Diet Regular Diet Adult     Order Specific Question Answer Comments   Is discharge order? Yes           Discharge Disposition   Discharged to home  Condition at discharge: Good      Hospital Course   89F with HTN, glaucoma, HLD admitted for weakness and confusion     Weakness, confusion  Unclear cause, resolved.  No arrhythmias.  Troponin, BMP, CBC unremarkable.  MRI of the head shows no acute finding.  Nonfocal neurologic exam.  UA, chest x-ray unremarkable.  Nl CK, B12, TSH, orthostatics, EEG unremarkable. Cervical MRI shows no central stenosis. Myethenia panal pending.   Appreciate neurology consultation.     Essential hypertension-accelerated on admission.  Improved now.  Continue lisinopril, metoprolol.       Glaucoma-continue home medication    Multinodular thyroid-normal TSH.  Asymptomatic.  Recommend thyroid ultrasound at follow-up.        Clinically Significant Risk Factors Present on  "Admission                      # Platelet Defect: home medication list includes an antiplatelet medication          Diet: Combination Diet Regular Diet Adult    DVT Prophylaxis: Heparin SQ  Ramos Catheter: Not present  Central Lines: None  Code Status: Full Code       Disposition Plan   Expected discharge: 10/05/2021   recommended to prior living arrangement once Neurology evaluation.  The patient's care was discussed with the Bedside Nurse, Care Coordinator/, Patient and Patient's Family. for total time 88 minutes with greater than 50% of total time spent in counseling and coordination of care.           Consultations This Hospital Stay   CARE MANAGEMENT / SOCIAL WORK IP CONSULT  PHYSICAL THERAPY ADULT IP CONSULT  OCCUPATIONAL THERAPY ADULT IP CONSULT  NEUROLOGY IP CONSULT  SOCIAL WORK IP CONSULT    Code Status   Prior    Time Spent on this Encounter   I, ROBERT ROSALES MD, personally saw the patient today and spent greater than 30 minutes discharging this patient.       ROBERT ROSALES MD  [address] [address]  Phone: [phone]  Fax: [phone]  ______________________________________________________________________    Physical Exam   Vital Signs:                    Weight: 153 lbs 1.6 oz  Physical Exam:       /67 (BP Location: Right arm)   Pulse 68   Temp 98.1  F (36.7  C) (Oral)   Resp 17   Ht 1.549 m (5' 1\")   Wt 69.4 kg (153 lb 1.6 oz)   SpO2 95%   BMI 28.93 kg/m    General appearance: alert, appears stated age and cooperative  Head: Normocephalic, without obvious abnormality, atraumatic  Eyes: Clear conjuctiva  Neck: no JVD and supple, symmetrical, trachea midline  Lungs: clear to auscultation bilaterally  Heart: regular rate and rhythm, S1, S2 normal, no murmur, click, rub or gallop  Abdomen: soft, non-tender; bowel sounds normal; no masses,  no organomegaly  Extremities: Guicho's sign is negative, no sign of " DVT  Skin: Skin color, texture, turgor normal. No rashes or lesions  Neurologic: Mental status: Alert, oriented, thought content appropriate  Cranial nerves: normal  Sensory: normal  Motor:grossly normal             Primary Care Physician   Nora Esteves    Discharge Orders      Reason for your hospital stay    weakness     Follow-up and recommended labs and tests     Follow up with primary care provider, Nora Esteves, within 7 days for hospital follow- up.  The following labs/tests are recommended: Thyroid ultrasound.     Activity    Your activity upon discharge: activity as tolerated     Discharge Instructions    Neurology in 1 month     Diet    Follow this diet upon discharge: Orders Placed This Encounter      Combination Diet Regular Diet Adult       Significant Results and Procedures   Most Recent 3 CBC's:  Recent Labs   Lab Test 10/05/21  0622 10/04/21  0448 10/03/21  1822   WBC 4.1 4.7 4.8   HGB 12.1 11.7 12.2   MCV 96 95 95   * 145* 150     Most Recent 3 BMP's:  Recent Labs   Lab Test 10/05/21  0622 10/04/21  0448 10/03/21  2213 10/03/21  2138 10/03/21  1822 10/03/21  1822    142  --   --   --  139   POTASSIUM 3.8 3.8 4.2  --    < > 4.0   CHLORIDE 106 110*  --   --   --  105   CO2 30 24  --   --   --  23   BUN 16 18  --   --   --  20   CR 0.71 0.67  --   --   --  0.76   ANIONGAP 6 8  --   --   --  11   RACHEAL 9.6 9.5  --   --   --  10.2    100  --  143*  --  129*    < > = values in this interval not displayed.     Most Recent 2 LFT's:  Recent Labs   Lab Test 02/11/21  1602 11/26/19  1044   AST 16 21   ALT 15 21   ALKPHOS 89 83   BILITOTAL 0.6 0.8     Most Recent TSH and T4:  Recent Labs   Lab Test 10/04/21  0448   TSH 0.64     Most Recent Urinalysis:  Recent Labs   Lab Test 10/03/21  1829   COLOR Light Yellow   APPEARANCE Clear   URINEGLC Negative   URINEBILI Negative   URINEKETONE Negative   SG 1.007   UBLD Negative   URINEPH 5.0   PROTEIN Negative   NITRITE Negative   LEUKEST  Negative   RBCU 0   WBCU 0     Most Recent CPK:  Recent Labs   Lab Test 10/04/21  0448   CKT 86   ,   Results for orders placed or performed during the hospital encounter of 10/03/21   XR Chest Port 1 View    Narrative    EXAM: XR CHEST PORT 1 VIEW  LOCATION: [facility]  DATE/TIME: 10/3/2021 8:28 PM    INDICATION: unexplained weaknes.  JARVIS.  ?PNA  COMPARISON: None.      Impression    IMPRESSION: Mild cardiomegaly. Lungs are clear. No pleural effusion or pneumothorax.   MR Brain w/o Contrast    Narrative    EXAM: MR BRAIN W/O CONTRAST  LOCATION: [facility]  DATE/TIME: 10/4/2021 9:01 AM    INDICATION: History of altered mental status.  COMPARISON: None available in PACS  TECHNIQUE: Routine multiplanar multisequence head MRI without intravenous contrast.    FINDINGS:  INTRACRANIAL CONTENTS: No acute or subacute infarct. No mass, acute hemorrhage, or extra-axial fluid collections. Moderate global parenchymal volume loss with concordant prominence of the ventricular system. Patchy periventricular and subcortical white   matter T2/FLAIR signal hyperintensity. Normal position of the cerebellar tonsils. Prominent senescent basal ganglia mineralization.    SELLA: No abnormality accounting for technique.    OSSEOUS STRUCTURES/SOFT TISSUES: Normal marrow signal. The major intracranial vascular flow voids are maintained.     ORBITS: No abnormality accounting for technique. Cataract surgery bilaterally.    SINUSES/MASTOIDS: No paranasal sinus mucosal disease. No middle ear or mastoid effusion.       Impression    IMPRESSION:    1. No evidence of acute intracranial abnormality. No evidence of hemorrhage, mass or acute infarct.    2. Nonspecific white matter disease, most commonly due to chronic microvascular ischemia in a patient of this age.   MR Cervical Spine w/o & w Contrast    Narrative    EXAM: MR CERVICAL SPINE W/O and W CONTRAST  LOCATION: [facility]  HOSPITAL  DATE/TIME: 10/5/2021 7:45 AM    INDICATION: History of generalized weakness.  COMPARISON: No direct comparison examination available PACS.  CONTRAST: 7 mL Gadavist IV  TECHNIQUE: MRI Cervical Spine without and with IV contrast.    FINDINGS: 1 mm anterior subluxation C2 on C3. 2 mm anterior subluxation C7 on T1. Modic 2 degenerative endplate changes throughout the cervical spine. Nonspecific mildly heterogeneous bone marrow signal. No focal destructive osseous lesion. Vertebral   body heights, alignment and marrow signal otherwise within normal limits. Nonspecific minimal soft tissue edema left paramedian C1-C2 posterior element interspace. No abnormal cord signal. No pathologic enhancement.    Craniovertebral junction and C1-C2: Mild degenerative changes.    C2-C3: Normal disc height. No herniation. Small disc bulge. Facet arthropathy. No spinal canal stenosis. Mild neural foraminal stenosis bilaterally.     C3-C4: Moderate disc height loss. No herniation. Disc bulge. Facet arthropathy and uncovertebral joint hypertrophy. No significant spinal canal stenosis. Moderate right and mild left neural foraminal stenosis.     C4-C5: Moderate disc height loss. No herniation. Disc bulge. Facet arthropathy and uncovertebral joint hypertrophy. No significant spinal canal stenosis. Severe right and moderate left neural foraminal stenosis.     C5-C6: Moderate disc height loss. No herniation. Disc bulge. Facet arthropathy and uncovertebral joint hypertrophy. No significant spinal canal stenosis. Moderate right and severe left neural foraminal stenosis.     C6-C7: Severe disc height loss. No herniation. Disc bulge. Facet arthropathy and uncovertebral joint hypertrophy. No significant spinal canal stenosis. Moderate neural foraminal stenosis bilaterally.      C7-T1: Normal disc height. No herniation. Distortion of the disc space secondary to 2 mm anterior subluxation present at this level. No spinal canal or neural  foraminal stenosis    Multiple thyroid nodules bilaterally measuring up to 16 mm in maximum axial dimension. The enlarged left thyroid lobe appears to partially displace the esophagus rightward at the inferior limit of the imaging. No other extraspinal abnormality.      Impression    IMPRESSION:    1. Multilevel cervical spondylosis, with findings most notable for multilevel moderate and severe neural foraminal stenosis secondary to facet arthropathy and uncovertebral joint hypertrophy. Severe disc height loss C6-C7. Multilevel disc bulges without   herniation. No high-grade narrowing of the spinal canal, nor spinal cord signal abnormality/myelomalacia.    2. Multiple thyroid nodules bilaterally measuring up to 16 mm in maximum axial dimension. The enlarged left thyroid lobe appears to partially displace the esophagus rightward at the inferior limit of the imaging. Consider further evaluation with thyroid   ultrasound or CT if the patient is experiencing dysphagia.   CTA Head Neck with Contrast    Narrative    EXAM: CTA  HEAD NECK WITH CONTRAST  LOCATION: [facility]  DATE/TIME: 10/5/2021 8:56 AM    INDICATION: 10/04/2021 MRI brain  COMPARISON: None.  CONTRAST: Isovue 370 75ml  TECHNIQUE: Head and neck CT angiogram with IV contrast. Noncontrast head CT followed by axial helical CT images of the head and neck vessels obtained during the arterial phase of intravenous contrast administration. Axial 2D reconstructed images and   multiplanar 3D MIP reconstructed images of the head and neck vessels were performed by the technologist. Dose reduction techniques were used. All stenosis measurements made according to NASCET criteria unless otherwise specified.    FINDINGS:   NONCONTRAST HEAD CT:   INTRACRANIAL CONTENTS: No intracranial hemorrhage, extraaxial collection, or mass effect.  No CT evidence of acute infarct. Mild presumed chronic small vessel ischemic changes. Normal ventricles and sulci.      VISUALIZED ORBITS/SINUSES/MASTOIDS: Prior bilateral cataract surgery. Visualized portions of the orbits are otherwise unremarkable. No paranasal sinus mucosal disease. No middle ear or mastoid effusion.    BONES/SOFT TISSUES: No acute abnormality.    HEAD CTA:  ANTERIOR CIRCULATION: No stenosis/occlusion, aneurysm, or high flow vascular malformation. Fetal origin of the left posterior cerebral artery from the anterior circulation.    POSTERIOR CIRCULATION: No stenosis/occlusion, aneurysm, or high flow vascular malformation. Balanced vertebral arteries supply a normal basilar artery.     DURAL VENOUS SINUSES: Not well evaluated on a technical basis.    NECK CTA:  RIGHT CAROTID: No measurable stenosis or dissection.    LEFT CAROTID: No measurable stenosis or dissection.    VERTEBRAL ARTERIES: No focal stenosis or dissection. Balanced vertebral arteries.    AORTIC ARCH: Classic aortic arch anatomy with no significant stenosis at the origin of the great vessels.    NONVASCULAR STRUCTURES: Multinodular thyroid gland. Largest lesion in the inferior left thyroid lobe measures up to 31 mm. No additional acute or aggressive abnormality.      Impression    IMPRESSION:   HEAD CT:  1.  No acute intracranial abnormality.    HEAD CTA:   1.  Normal CTA [place].    NECK CTA:  1.  No stenosis or dissection.  2.  Multinodular thyroid gland. Consider dedicated thyroid ultrasound, if not already performed.   Echocardiogram Complete     Value    LVEF  60-65%    Narrative    [id number]  [id number]  [id number]  [id number]^[place] [address]     Name: JUAN JOSE GALLAGHER  MRN: [id number]  : 1931  Study Date: 10/04/2021 01:17 PM  Age: 89 yrs  Gender: Female  Patient Location: [facility]  Reason For Study: JARVIS  Ordering Physician: ABDELRAHMAN BROWN  Performed By: MICHEL     BSA: 1.7 m2  Height: 61 in  Weight: 154  lb  ______________________________________________________________________________  ______________________________________________________________________________  Interpretation Summary     Left ventricular size, wall motion and function are normal. The ejection  fraction is 60-65%.  Normal right ventricle size and systolic function.  IVC diameter <2.1 cm collapsing >50% with sniff suggests a normal RA pressure  of 3 mmHg.  No hemodynamically significant valvular abnormalities on 2D or color flow  imaging.  ______________________________________________________________________________  I      WMSI = 1.00     % Normal = 100     X - Cannot   0 -                      (2) - Mildly 2 -          Segments  Size  Interpret    Hyperkinetic 1 - Normal  Hypokinetic  Hypokinetic  1-2     small                                                     7 -          3-5      moderate  3 - Akinetic 4 -          5 -         6 - Akinetic Dyskinetic   6-14    large               Dyskinetic   Aneurysmal  w/scar       w/scar       15-16   diffuse     Left Ventricle  Left ventricular size, wall motion and function are normal. The ejection  fraction is 60-65%. There is normal left ventricular wall thickness. Left  ventricular diastolic function is normal. No regional wall motion  abnormalities noted.     Right Ventricle  Normal right ventricle size and systolic function.     Atria  Normal left atrial size. Right atrial size is normal. There is no color  Doppler evidence of an atrial shunt.     Mitral Valve  Mitral valve leaflets appear normal. There is no evidence of mitral stenosis  or clinically significant mitral regurgitation. There is trace mitral  regurgitation.     Tricuspid Valve  Right ventricle systolic pressure estimate normal.     Aortic Valve  The aortic valve is trileaflet with aortic valve sclerosis. There is trace to  mild aortic regurgitation.     Pulmonic Valve  The pulmonic valve is not well seen, but is grossly normal. This  degree of  valvular regurgitation is within normal limits.     Vessels  The aorta root is normal. Normal size ascending aorta. IVC diameter <2.1 cm  collapsing >50% with sniff suggests a normal RA pressure of 3 mmHg.     Pericardium  There is no pericardial effusion.     ______________________________________________________________________________  MMode/2D Measurements & Calculations  IVSd: 1.0 cm  LVIDd: 4.3 cm  LVIDs: 2.8 cm  LVPWd: 0.82 cm  FS: 36.5 %  LV mass(C)d: 128.8 grams  LV mass(C)dI: 76.2 grams/m2     Ao root diam: 2.7 cm  LA dimension: 3.1 cm  LA/Ao: 1.1  LVOT diam: 2.0 cm  LVOT area: 3.1 cm2  LA Volume Indexed (AL/bp): 21.9 ml/m2  RWT: 0.38  TAPSE: 2.2 cm     Time Measurements  MM HR: 76.0 BPM     Doppler Measurements & Calculations  MV E max solo: 48.4 cm/sec  MV A max solo: 69.4 cm/sec  MV E/A: 0.70  MV dec time: 0.37 sec  LV V1 max PG: 3.2 mmHg  LV V1 max: 89.5 cm/sec  LV V1 VTI: 21.9 cm  SV(LVOT): 67.9 ml  SI(LVOT): 40.2 ml/m2  PA acc time: 0.09 sec  E/E' av.3  Lateral E/e': 8.9  Medial E/e': 9.7     ______________________________________________________________________________  Report approved by: Clark Giordano 10/04/2021 02:28 PM               Discharge Medications   Discharge Medication List as of 10/5/2021  2:14 PM      CONTINUE these medications which have NOT CHANGED    Details   aspirin 81 MG EC tablet Take 81 mg by mouth daily, Historical      atorvastatin (LIPITOR) 20 MG tablet [ATORVASTATIN (LIPITOR) 20 MG TABLET] Take 20 mg by mouth bedtime., Historical      cephalexin (KEFLEX) 250 MG capsule Take 1,000 mg by mouth continuous prn (prior to dental appointment) , Historical      dorzolamide (TRUSOPT) 2 % ophthalmic solution Place 1 drop into both eyes 2 times daily, Historical      fish oil-omega-3 fatty acids 1000 MG capsule Take 1 g by mouth daily, Historical      ibuprofen (ADVIL/MOTRIN) 200 MG tablet Take 400 mg by mouth every 8 hours as needed for mild pain, Historical       latanoprost (XALATAN) 0.005 % ophthalmic solution Place 1 drop into both eyes At Bedtime , Historical      lisinopril (ZESTRIL) 2.5 MG tablet Take 2.5 mg by mouth daily, Historical      metoprolol succinate ER (TOPROL-XL) 50 MG 24 hr tablet Take 50 mg by mouth daily, Historical      multivitamin w/minerals (THERA-VIT-M) tablet Take 1 tablet by mouth daily, Historical      oxybutynin ER (DITROPAN-XL) 5 MG 24 hr tablet Take 5 mg by mouth daily, Historical      Turmeric 500 MG CAPS Take 500 mg by mouth daily, Historical           Allergies   Allergies   Allergen Reactions     Epinephrine Unknown

## 2021-10-11 LAB
ACHR BIND IGG+IGM SER IA-SCNC: 0 NMOL/L
IMMUNOLOGIST REVIEW: NORMAL
MUSK AB SER-SCNC: 0 NMOL/L (ref 0–0.02)

## 2022-02-16 ENCOUNTER — LAB REQUISITION (OUTPATIENT)
Dept: LAB | Facility: CLINIC | Age: 87
End: 2022-02-16
Payer: COMMERCIAL

## 2022-02-16 DIAGNOSIS — I10 ESSENTIAL (PRIMARY) HYPERTENSION: ICD-10-CM

## 2022-02-16 DIAGNOSIS — E11.9 TYPE 2 DIABETES MELLITUS WITHOUT COMPLICATIONS (H): ICD-10-CM

## 2022-02-16 DIAGNOSIS — E78.5 HYPERLIPIDEMIA, UNSPECIFIED: ICD-10-CM

## 2022-02-16 LAB
ALBUMIN SERPL-MCNC: 4.1 G/DL (ref 3.5–5)
ALP SERPL-CCNC: 70 U/L (ref 45–120)
ALT SERPL W P-5'-P-CCNC: 12 U/L (ref 0–45)
ANION GAP SERPL CALCULATED.3IONS-SCNC: 13 MMOL/L (ref 5–18)
AST SERPL W P-5'-P-CCNC: 13 U/L (ref 0–40)
BILIRUB SERPL-MCNC: 0.7 MG/DL (ref 0–1)
BUN SERPL-MCNC: 20 MG/DL (ref 8–28)
CALCIUM SERPL-MCNC: 9.9 MG/DL (ref 8.5–10.5)
CHLORIDE BLD-SCNC: 104 MMOL/L (ref 98–107)
CHOLEST SERPL-MCNC: 156 MG/DL
CO2 SERPL-SCNC: 23 MMOL/L (ref 22–31)
CREAT SERPL-MCNC: 0.75 MG/DL (ref 0.6–1.1)
GFR SERPL CREATININE-BSD FRML MDRD: 75 ML/MIN/1.73M2
GLUCOSE BLD-MCNC: 190 MG/DL (ref 70–125)
HDLC SERPL-MCNC: 43 MG/DL
LDLC SERPL CALC-MCNC: 56 MG/DL
POTASSIUM BLD-SCNC: 4.3 MMOL/L (ref 3.5–5)
PROT SERPL-MCNC: 6.3 G/DL (ref 6–8)
SODIUM SERPL-SCNC: 140 MMOL/L (ref 136–145)
TRIGL SERPL-MCNC: 284 MG/DL

## 2022-02-16 PROCEDURE — 80061 LIPID PANEL: CPT | Mod: ORL | Performed by: FAMILY MEDICINE

## 2022-02-16 PROCEDURE — 80053 COMPREHEN METABOLIC PANEL: CPT | Mod: ORL | Performed by: FAMILY MEDICINE

## 2022-07-29 ENCOUNTER — HOSPITAL ENCOUNTER (EMERGENCY)
Facility: HOSPITAL | Age: 87
Discharge: HOME | End: 2022-07-30
Admitting: PHYSICIAN ASSISTANT
Payer: COMMERCIAL

## 2022-07-29 ENCOUNTER — APPOINTMENT (OUTPATIENT)
Dept: RADIOLOGY | Facility: HOSPITAL | Age: 87
End: 2022-07-29
Payer: COMMERCIAL

## 2022-07-29 ENCOUNTER — APPOINTMENT (OUTPATIENT)
Dept: CT IMAGING | Facility: HOSPITAL | Age: 87
End: 2022-07-29
Payer: COMMERCIAL

## 2022-07-29 VITALS
DIASTOLIC BLOOD PRESSURE: 70 MMHG | HEIGHT: 61 IN | OXYGEN SATURATION: 94 % | TEMPERATURE: 99.4 F | BODY MASS INDEX: 29.45 KG/M2 | WEIGHT: 156 LBS | SYSTOLIC BLOOD PRESSURE: 151 MMHG | HEART RATE: 65 BPM | RESPIRATION RATE: 18 BRPM

## 2022-07-29 DIAGNOSIS — M25.411 SHOULDER EFFUSION, RIGHT: ICD-10-CM
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
## 2022-07-29 DIAGNOSIS — M25.511 ACUTE PAIN OF RIGHT SHOULDER: ICD-10-CM

## 2022-07-29 PROBLEM — M17.9 OSTEOARTHRITIS OF KNEE: Status: ACTIVE | Noted: 2021-11-16

## 2022-07-29 PROBLEM — Z79.1 LONG TERM (CURRENT) USE OF NON-STEROIDAL ANTI-INFLAMMATORIES (NSAID): Status: ACTIVE | Noted: 2022-07-29

## 2022-07-29 PROBLEM — H26.9 CATARACT: Status: ACTIVE | Noted: 2022-07-29

## 2022-07-29 PROBLEM — M24.80 CERVICAL SPINE CREPITUS: Status: ACTIVE | Noted: 2022-07-29

## 2022-07-29 PROBLEM — M19.049 LOCALIZED, PRIMARY OSTEOARTHRITIS OF HAND: Status: ACTIVE | Noted: 2022-07-29

## 2022-07-29 PROBLEM — F43.29 ADJUSTMENT DISORDER WITH MIXED EMOTIONAL FEATURES: Status: ACTIVE | Noted: 2022-07-29

## 2022-07-29 PROBLEM — R41.3 AMNESIA: Status: ACTIVE | Noted: 2022-07-29

## 2022-07-29 PROBLEM — Z79.02 ENCOUNTER FOR CURRENT LONG TERM USE OF ANTIPLATELET DRUG: Status: ACTIVE | Noted: 2022-07-29

## 2022-07-29 PROCEDURE — 73030 X-RAY EXAM OF SHOULDER: CPT | Mod: RT

## 2022-07-29 PROCEDURE — 99284 EMERGENCY DEPT VISIT MOD MDM: CPT | Mod: 25

## 2022-07-29 PROCEDURE — 73200 CT UPPER EXTREMITY W/O DYE: CPT | Mod: RT

## 2022-07-29 ASSESSMENT — ENCOUNTER SYMPTOMS
SHORTNESS OF BREATH: 0
LIGHT-HEADEDNESS: 0
VOMITING: 0
HEADACHES: 0
ABDOMINAL PAIN: 0
DIZZINESS: 0

## 2022-07-30 ASSESSMENT — ENCOUNTER SYMPTOMS
NUMBNESS: 0
WEAKNESS: 0
NAUSEA: 0
WOUND: 0
NECK PAIN: 0
BRUISES/BLEEDS EASILY: 0
BACK PAIN: 0

## 2022-07-30 NOTE — DISCHARGE INSTRUCTIONS
No fracture. Small amount of fluid on the joint, likely from the trauma. Sling for comfort but should take out of sling a few times per day and do gentle range of motion. Tylenol 1000 mg every 8 hours as needed for pain. Follow up with primary care provider next week if pain persists. Return with new/worsening symptoms including severe pain, arm numbness or weakness, swelling.

## 2022-07-30 NOTE — ED TRIAGE NOTES
"Pt was walking down the cantu with her walker at home and pt states \"I just fell\". Pt denies LOC or hitting head. Pt denies any blood thinners. Pt c/o right shoulder and left leg pain. Pt did take tylenol and has been putting ice on/off her shoulder. The incident happened at 3 pm today.    "

## 2022-07-30 NOTE — ED PROVIDER NOTES
"EMERGENCY DEPARTMENT ENCOUNTER      NAME: Nae Mandel  AGE: 89 year old female  YOB: 1931  MRN: [id number]  EVALUATION DATE & TIME: 7/29/2022  9:53 PM    PCP: Nora Esteves    ED PROVIDER: Dea Chirinos PA-C      Chief Complaint   Patient presents with     Fall     No LOC, no thinners         FINAL IMPRESSION:  1. Shoulder effusion, right    2. Fall, initial encounter    3. Acute pain of right shoulder          MEDICAL DECISION MAKING:    Pertinent Labs & Imaging studies reviewed. (See chart for details)  89 year old female with a pertinent history of DM type II, HTN, generalized weakness, and osteoarthritis presents to the Emergency Department for evaluation of fall with right shoulder pain. She was walking down hallway with her walker and \"just fell.\" She does not recall tripping. No chest pain, shortness of breath, dizziness, light headedness, focal weakness. Denies hitting her head or loss of consciousness. She was able to get up after and walk with walker. Right shoulder pain has persisted. She reports chronic left knee pain and wears a brace.     Vitals reviewed and notable for elevated blood pressure, improved throughout time in ED. GCS 15. Cervical spine cleared clinically. On exam, point tenderness to proximal right humerus and pain with flexion and abduction of right shoulder. No tenderness to scapula, clavicle, elbow, forearm. No obvious deformity. 2+ and symmetric radial pulses. Normal sensation and full strenth distally. All other major joints palpated with no tenderness and full ROM including left knee which she has chronic pain in. She is able to ambulate. No midline spinal tenderness or tenderness with palpation of back. Abdomen soft and non-tender. Remainder of head to toe trauma exam unremarkable. Differential diagnosis includes but not limited to fracture, dislocation, ligamentous injury, contusion, muscle strain, joint effusion.     Xray right shoulder inconclusive for " "fracture thus we proceeded with CT scan. CT with small shoulder joint effusion but no evidence of acute, displaced fracture or dislocation. Chronic arthritic changes. She was placed in sling for comfort with instructions to take arm out of sling and do gentle ROM a few times per day to prevent shoulder from getting stiff. Tylenol as needed for pain. Her and her son feel comfortable with her discharging home. She is to follow up with her PCP after the weekend to ensure improvement. Discussed return precautions and patient discharged home in stable condition.     0 minutes of critical care time     ED COURSE:  10:10 PM I met with the patient, obtained history, performed an initial exam, and discussed options and plan for diagnostics and treatment here in the ED.  12:13 AM Patient discharged after being provided with extensive anticipatory guidance and given return precautions, importance of PCP follow-up emphasized.    At the conclusion of the encounter I discussed the results of all of the tests and the disposition. The questions were answered. The patient and family acknowledged understanding and were agreeable with the care plan.     MEDICATIONS GIVEN IN THE EMERGENCY:  Medications - No data to display    NEW PRESCRIPTIONS STARTED AT TODAY'S ER VISIT  Discharge Medication List as of 7/30/2022 12:44 AM               =================================================================    HPI:    Patient information was obtained from: patient and her son    Use of Interpretor: N/A    Nae Mandel is a 89 year old female with a pertinent history of DM type II, HTN, generalized weakness, and osteoarthritis who presents to this ED by private vehicle for evaluation of right shoulder pain post fall.    At ~3:00 PM today, the patient was walking down the cantu in her home when she \"just fell\". The patient denies tripping or bumping into anything to cause the fall and denies any chest pain, shortness of breath, dizziness, or " lightheadedness prior to the fall. She did not hit her head or lose consciousness. The patient was able to get up and ambulate right after the fall. The patient reportedly fell on her right shoulder. She noticed shoulder pain after the fall which has persisted despite icing the area and taking Tylenol, prompting her presentation at the ED today. The patient also reports pain in her left lower leg, but attributes this to chronic knee pain. She was able to ambulate after without difficulty. She denies any vomiting, headache, new neck pain, back pain or any other complaints at this time.    Of note, the patient is not on any blood thinners.    REVIEW OF SYSTEMS:  Review of Systems   Respiratory: Negative for shortness of breath.    Cardiovascular: Negative for chest pain.   Gastrointestinal: Negative for abdominal pain, nausea and vomiting.   Musculoskeletal: Negative for back pain and neck pain.        Positive for pain in right upper arm/shoulder.  Positive for chronic left knee pain   Skin: Negative for wound.   Neurological: Negative for dizziness, syncope, weakness, light-headedness, numbness and headaches.   Hematological: Does not bruise/bleed easily.   All other systems reviewed and are negative.     PAST MEDICAL HISTORY:  No past medical history on file.    PAST SURGICAL HISTORY:  Past Surgical History:   Procedure Laterality Date     ARTHROPLASTY KNEE       OTHER SURGICAL HISTORY      Bilateral hip arthroplasty           CURRENT MEDICATIONS:    No current facility-administered medications for this encounter.    Current Outpatient Medications:      aspirin 81 MG EC tablet, Take 81 mg by mouth daily, Disp: , Rfl:      atorvastatin (LIPITOR) 20 MG tablet, [ATORVASTATIN (LIPITOR) 20 MG TABLET] Take 20 mg by mouth bedtime., Disp: , Rfl:      cephalexin (KEFLEX) 250 MG capsule, Take 1,000 mg by mouth continuous prn (prior to dental appointment) , Disp: , Rfl:      dorzolamide (TRUSOPT) 2 % ophthalmic solution,  "Place 1 drop into both eyes 2 times daily, Disp: , Rfl:      fish oil-omega-3 fatty acids 1000 MG capsule, Take 1 g by mouth daily, Disp: , Rfl:      ibuprofen (ADVIL/MOTRIN) 200 MG tablet, Take 400 mg by mouth every 8 hours as needed for mild pain, Disp: , Rfl:      latanoprost (XALATAN) 0.005 % ophthalmic solution, Place 1 drop into both eyes At Bedtime , Disp: , Rfl:      lisinopril (ZESTRIL) 2.5 MG tablet, Take 2.5 mg by mouth daily, Disp: , Rfl:      metoprolol succinate ER (TOPROL-XL) 50 MG 24 hr tablet, Take 50 mg by mouth daily, Disp: , Rfl:      multivitamin w/minerals (THERA-VIT-M) tablet, Take 1 tablet by mouth daily, Disp: , Rfl:      oxybutynin ER (DITROPAN-XL) 5 MG 24 hr tablet, Take 5 mg by mouth daily, Disp: , Rfl:      Turmeric 500 MG CAPS, Take 500 mg by mouth daily, Disp: , Rfl:       ALLERGIES:  Allergies   Allergen Reactions     Epinephrine Unknown       FAMILY HISTORY:  Family History   Problem Relation Age of Onset     Hereditary Breast and Ovarian Cancer Syndrome No family hx of      Breast Cancer No family hx of      Cancer No family hx of      Colon Cancer No family hx of      Endometrial Cancer No family hx of      Ovarian Cancer No family hx of        SOCIAL HISTORY:   Social History     Socioeconomic History     Marital status:    Tobacco Use     Smoking status: Never Smoker   Substance and Sexual Activity     Alcohol use: No     Drug use: No       VITALS:  Patient Vitals for the past 24 hrs:   BP Temp Temp src Pulse Resp SpO2 Height Weight   07/29/22 2315 (!) 151/70 -- -- 65 18 94 % -- --   07/29/22 2300 (!) 152/73 -- -- 66 20 93 % -- --   07/29/22 2215 (!) 177/86 -- -- 67 20 96 % -- --   07/29/22 2141 (!) 189/84 99.4  F (37.4  C) Oral 74 16 95 % 1.549 m (5' 1\") 70.8 kg (156 lb)       PHYSICAL EXAM    General Appearance: Alert, cooperative, normal speech and facial symmetry, appears stated age  Head:  Normocephalic, without obvious abnormality, atraumatic  Eyes:  PERRL, " conjunctiva/corneas clear, EOM's intact, no orbital injury  ENT:  No obvious facial deformity. No tenderness to palpation. No epistaxis. Normal dentition.  Normal bite.  No malocclusion.  No oral pharyngeal bleeding no dysphonia or difficulty swallowing, membranes are moist without pallor  Neck:  No midline cervical spine tenderness.  No paraspinal tenderness. No pain with axial loading. Supple, symmetrical, trachea midline, no stridor or dysphonia, no soft tissue swelling or tenderness  Chest:  No tenderness or deformity, no crepitus  Cardio:  Regular rate and rhythm, S1 and S2 normal, no murmur, rub or gallop, 2+ pulses symmetric in all extremities  Pulm:  Clear to auscultation bilaterally, respirations unlabored,   Back:  Symmetric, no curvature, ROM normal, no CVA tenderness, no spinal tenderness  Abdomen: Soft, non-tender, bowel sounds active all four quadrants, no masses, no organomegaly, no rebound or guarding, no pelvic pain to compression  Extremities:  No obvious deformity, all joints palpated in place with full range of motion. Point tenderness to proximal right humerus and pain with flexion and abduction of right shoulder. No tenderness to scapula, clavicle, elbow, forearm. No obvious deformity. Patient is able to bear full weight, no tenderness over joints of lower extremities, no cyanosis or edema, pulses equal in all extremities, normal cap refill  Skin:  Skin color, texture, turgor normal, no rashes or lesions  Neuro:  Awake, alert, responsive to voice, follows commands, normal speech, No gross motor weakness or sensory loss, moves all extremities, baseline ambulation, GCS 15        RADIOLOGY:  Reviewed all pertinent imaging. Please see official radiology report.  CT Shoulder Right w/o Contrast   Final Result   IMPRESSION:   1.  Small shoulder joint effusion but no evidence of acute, displaced fracture or dislocation. Chronic arthritic changes as above.         XR Shoulder Right 2 Views   Final  Result   IMPRESSION: On the AP view, there appears to be some slight widening of the acromial humeral interspace; however, on the Y-view, there appears to be normal alignment of the humeral head with the glenoid rim, thus the findings on the AP view would be most    typical for changes of projection. There are moderate AC joint hypertrophic changes seen. Mild hypertrophic changes humeral head and glenoid rim. No obvious fractures are seen. If there is high clinical concern for a fracture of the right shoulder, a    thin section CT may be helpful for further evaluation.          I, Nereyda Enriquez, am serving as a scribe to document services personally performed by Dea Chirinos PA-C based on my observation and the provider's statements to me. I, Dea Chirinos PA-C attest that Nereyda Enriquez is acting in a scribe capacity, has observed my performance of the services and has documented them in accordance with my direction.    Dea Chirinos PA-C  Emergency Medicine  [facility]  7/29/2022     Dea Chirinos PA-C  07/30/22 1145

## 2022-09-01 ENCOUNTER — MEDICAL CORRESPONDENCE (OUTPATIENT)
Dept: HEALTH INFORMATION MANAGEMENT | Facility: CLINIC | Age: 87
End: 2022-09-01

## 2022-12-04 ENCOUNTER — OFFICE VISIT (OUTPATIENT)
Dept: FAMILY MEDICINE | Facility: CLINIC | Age: 87
End: 2022-12-04
Payer: COMMERCIAL

## 2022-12-04 VITALS
DIASTOLIC BLOOD PRESSURE: 74 MMHG | OXYGEN SATURATION: 96 % | HEART RATE: 83 BPM | SYSTOLIC BLOOD PRESSURE: 119 MMHG | TEMPERATURE: 98.6 F

## 2022-12-04 DIAGNOSIS — Z11.9 SCREENING EXAMINATION FOR INFECTIOUS DISEASE: Primary | ICD-10-CM

## 2022-12-04 DIAGNOSIS — K52.9 GASTROENTERITIS: ICD-10-CM

## 2022-12-04 LAB
FLUAV AG SPEC QL IA: NEGATIVE
FLUBV AG SPEC QL IA: NEGATIVE

## 2022-12-04 PROCEDURE — U0003 INFECTIOUS AGENT DETECTION BY NUCLEIC ACID (DNA OR RNA); SEVERE ACUTE RESPIRATORY SYNDROME CORONAVIRUS 2 (SARS-COV-2) (CORONAVIRUS DISEASE [COVID-19]), AMPLIFIED PROBE TECHNIQUE, MAKING USE OF HIGH THROUGHPUT TECHNOLOGIES AS DESCRIBED BY CMS-2020-01-R: HCPCS | Performed by: FAMILY MEDICINE

## 2022-12-04 PROCEDURE — 99213 OFFICE O/P EST LOW 20 MIN: CPT | Mod: CS | Performed by: FAMILY MEDICINE

## 2022-12-04 PROCEDURE — U0005 INFEC AGEN DETEC AMPLI PROBE: HCPCS | Performed by: FAMILY MEDICINE

## 2022-12-04 PROCEDURE — 87804 INFLUENZA ASSAY W/OPTIC: CPT | Performed by: FAMILY MEDICINE

## 2022-12-04 NOTE — PROGRESS NOTES
Nae Mandel is a 89 year old female who comes in today for 3 days of symptoms     Vomiting and diarrhea, getting better    Vomited once yest, none today    Eating okay    Feels fine now she states    They live in assisted living    They are mandating masks now, no group activities    Urinating okay    No blood in stool    Had covid shots    ROS    Physical Exam  Constitutional:       Appearance: She is well-developed and well-nourished.   HENT:      Head: Normocephalic and atraumatic.   Eyes:      Conjunctiva/sclera: Conjunctivae normal.   Neck:      Vascular: No carotid bruit.   Cardiovascular:      Rate and Rhythm: Normal rate and regular rhythm.      Heart sounds: Normal heart sounds.   Pulmonary:      Effort: Pulmonary effort is normal. No respiratory distress.      Breath sounds: Normal breath sounds.   Abdominal:      General: There is no distension.      Palpations: Abdomen is soft.      Tenderness: There is no abdominal tenderness. There is no right CVA tenderness or left CVA tenderness.   Musculoskeletal:         General: No edema.   Neurological:      Mental Status: She is alert and oriented to person, place, and time.       ASSESSMENT / PLAN:  (Z11.9) Screening examination for infectious disease  (primary encounter diagnosis)  Comment: check for these conditions given family concern/ family member being very sick in hospital.    Plan: Symptomatic; Unknown COVID-19 Virus         (Coronavirus) by PCR Nose, Influenza A & B         Antigen - Clinic Collect             (K52.9) Gastroenteritis  Comment: improving.  Plan: stay well hydrated.       Follow up as needed based on symptoms    Be seen promptly if symptoms acutely worsen       I reviewed the patient's medications, allergies, medical history, family history, and social history.    Sagar Johnson MD

## 2022-12-04 NOTE — LETTER
December 6, 2022    Nae AJAY Grigsbyey  [address]  [address]  [facility] [address]          Dear ,    We are writing to inform you of your test results.    Covid and flu tests negative.      Resulted Orders   Symptomatic; Unknown COVID-19 Virus (Coronavirus) by PCR Nose   Result Value Ref Range    SARS CoV2 PCR Negative Negative      Comment:      NEGATIVE: SARS-CoV-2 (COVID-19) RNA not detected, presumed negative.    Narrative    Testing was performed using the ángel SARS-CoV-2 assay on the ángel  [employer] Mobile0 System. This test should be ordered for the detection of  SARS-CoV-2 in individuals who meet SARS-CoV-2 clinical and/or  epidemiological criteria. Test performance is unknown in asymptomatic  patients. This test is for in vitro diagnostic use under the FDA EUA  for laboratories certified under CLIA to perform high and/or moderate  complexity testing. This test has not been FDA cleared or approved. A  negative result does not rule out the presence of PCR inhibitors in  the specimen or target RNA in concentration below the limit of  detection for the assay. The possibility of a false negative should  be considered if the patient's recent exposure or clinical  presentation suggests COVID-19. This test was validated by the [facility]. This  laboratory is certified under the Clinical Laboratory Improvement  Amendments of 1988 (CLIA-88) as qualified to perform high and/or  moderate complexity laboratory testing.       If you have any questions or concerns, please call the clinic at the number listed above.       Sincerely,      Sagar Johnson MD

## 2022-12-04 NOTE — PATIENT INSTRUCTIONS
Stay well hydrated    We will notify you if flu or covid positive    Follow up as needed based on symptoms     Be seen promptly if symptoms acutely worsen

## 2022-12-05 ENCOUNTER — TELEPHONE (OUTPATIENT)
Dept: FAMILY MEDICINE | Facility: CLINIC | Age: 87
End: 2022-12-05

## 2022-12-05 LAB — SARS-COV-2 RNA RESP QL NAA+PROBE: NEGATIVE

## 2023-01-16 ENCOUNTER — LAB REQUISITION (OUTPATIENT)
Dept: LAB | Facility: CLINIC | Age: 88
End: 2023-01-16
Payer: COMMERCIAL

## 2023-01-16 DIAGNOSIS — E78.5 HYPERLIPIDEMIA, UNSPECIFIED: ICD-10-CM

## 2023-01-16 DIAGNOSIS — I10 ESSENTIAL (PRIMARY) HYPERTENSION: ICD-10-CM

## 2023-01-16 LAB
ALBUMIN SERPL BCG-MCNC: 4.3 G/DL (ref 3.5–5.2)
ALP SERPL-CCNC: 80 U/L (ref 35–104)
ALT SERPL W P-5'-P-CCNC: 18 U/L (ref 10–35)
ANION GAP SERPL CALCULATED.3IONS-SCNC: 16 MMOL/L (ref 7–15)
AST SERPL W P-5'-P-CCNC: 17 U/L (ref 10–35)
BILIRUB SERPL-MCNC: 0.5 MG/DL
BUN SERPL-MCNC: 20.8 MG/DL (ref 8–23)
CALCIUM SERPL-MCNC: 9.9 MG/DL (ref 8.2–9.6)
CHLORIDE SERPL-SCNC: 101 MMOL/L (ref 98–107)
CHOLEST SERPL-MCNC: 177 MG/DL
CREAT SERPL-MCNC: 0.8 MG/DL (ref 0.51–0.95)
DEPRECATED HCO3 PLAS-SCNC: 24 MMOL/L (ref 22–29)
GFR SERPL CREATININE-BSD FRML MDRD: 69 ML/MIN/1.73M2
GLUCOSE SERPL-MCNC: 192 MG/DL (ref 70–99)
HDLC SERPL-MCNC: 38 MG/DL
LDLC SERPL CALC-MCNC: 67 MG/DL
NONHDLC SERPL-MCNC: 139 MG/DL
POTASSIUM SERPL-SCNC: 4.5 MMOL/L (ref 3.4–5.3)
PROT SERPL-MCNC: 6 G/DL (ref 6.4–8.3)
SODIUM SERPL-SCNC: 141 MMOL/L (ref 136–145)
TRIGL SERPL-MCNC: 358 MG/DL

## 2023-01-16 PROCEDURE — 80053 COMPREHEN METABOLIC PANEL: CPT | Mod: ORL | Performed by: FAMILY MEDICINE

## 2023-01-16 PROCEDURE — 80061 LIPID PANEL: CPT | Mod: ORL | Performed by: FAMILY MEDICINE

## 2023-04-04 ENCOUNTER — DOCUMENTATION ONLY (OUTPATIENT)
Dept: OTHER | Facility: CLINIC | Age: 88
End: 2023-04-04

## 2024-01-16 ENCOUNTER — LAB REQUISITION (OUTPATIENT)
Dept: LAB | Facility: CLINIC | Age: 89
End: 2024-01-16
Payer: COMMERCIAL

## 2024-01-16 DIAGNOSIS — F03.90 UNSPECIFIED DEMENTIA, UNSPECIFIED SEVERITY, WITHOUT BEHAVIORAL DISTURBANCE, PSYCHOTIC DISTURBANCE, MOOD DISTURBANCE, AND ANXIETY (H): ICD-10-CM

## 2024-01-16 DIAGNOSIS — N18.9 CHRONIC KIDNEY DISEASE, UNSPECIFIED: ICD-10-CM

## 2024-01-17 LAB
ERYTHROCYTE [DISTWIDTH] IN BLOOD BY AUTOMATED COUNT: 12.6 % (ref 10–15)
HCT VFR BLD AUTO: 38.2 % (ref 35–47)
HGB BLD-MCNC: 12.4 G/DL (ref 11.7–15.7)
MCH RBC QN AUTO: 32.2 PG (ref 26.5–33)
MCHC RBC AUTO-ENTMCNC: 32.5 G/DL (ref 31.5–36.5)
MCV RBC AUTO: 99 FL (ref 78–100)
PLATELET # BLD AUTO: 148 10E3/UL (ref 150–450)
RBC # BLD AUTO: 3.85 10E6/UL (ref 3.8–5.2)
WBC # BLD AUTO: 6 10E3/UL (ref 4–11)

## 2024-01-17 PROCEDURE — 36415 COLL VENOUS BLD VENIPUNCTURE: CPT | Mod: ORL | Performed by: NURSE PRACTITIONER

## 2024-01-17 PROCEDURE — 82306 VITAMIN D 25 HYDROXY: CPT | Mod: ORL | Performed by: NURSE PRACTITIONER

## 2024-01-17 PROCEDURE — 82607 VITAMIN B-12: CPT | Mod: ORL | Performed by: NURSE PRACTITIONER

## 2024-01-17 PROCEDURE — 84443 ASSAY THYROID STIM HORMONE: CPT | Mod: ORL | Performed by: NURSE PRACTITIONER

## 2024-01-17 PROCEDURE — P9604 ONE-WAY ALLOW PRORATED TRIP: HCPCS | Mod: ORL | Performed by: NURSE PRACTITIONER

## 2024-01-17 PROCEDURE — 85027 COMPLETE CBC AUTOMATED: CPT | Mod: ORL | Performed by: NURSE PRACTITIONER

## 2024-01-17 PROCEDURE — 80048 BASIC METABOLIC PNL TOTAL CA: CPT | Mod: ORL | Performed by: NURSE PRACTITIONER

## 2024-01-18 LAB
ANION GAP SERPL CALCULATED.3IONS-SCNC: 10 MMOL/L (ref 7–15)
BUN SERPL-MCNC: 22.9 MG/DL (ref 8–23)
CALCIUM SERPL-MCNC: 9.3 MG/DL (ref 8.2–9.6)
CHLORIDE SERPL-SCNC: 102 MMOL/L (ref 98–107)
CREAT SERPL-MCNC: 0.62 MG/DL (ref 0.51–0.95)
DEPRECATED HCO3 PLAS-SCNC: 25 MMOL/L (ref 22–29)
EGFRCR SERPLBLD CKD-EPI 2021: 83 ML/MIN/1.73M2
GLUCOSE SERPL-MCNC: 158 MG/DL (ref 70–99)
POTASSIUM SERPL-SCNC: 4.4 MMOL/L (ref 3.4–5.3)
SODIUM SERPL-SCNC: 137 MMOL/L (ref 135–145)
TSH SERPL DL<=0.005 MIU/L-ACNC: 1.49 UIU/ML (ref 0.3–4.2)
VIT B12 SERPL-MCNC: 531 PG/ML (ref 232–1245)
VIT D+METAB SERPL-MCNC: 25 NG/ML (ref 20–50)

## 2024-02-19 ENCOUNTER — HOSPITAL ENCOUNTER (EMERGENCY)
Facility: HOSPITAL | Age: 89
Discharge: HOME | End: 2024-02-19
Attending: EMERGENCY MEDICINE | Admitting: EMERGENCY MEDICINE
Payer: COMMERCIAL

## 2024-02-19 ENCOUNTER — APPOINTMENT (OUTPATIENT)
Dept: RADIOLOGY | Facility: HOSPITAL | Age: 89
End: 2024-02-19
Attending: EMERGENCY MEDICINE
Payer: COMMERCIAL

## 2024-02-19 VITALS
WEIGHT: 156 LBS | HEIGHT: 66 IN | SYSTOLIC BLOOD PRESSURE: 161 MMHG | BODY MASS INDEX: 25.07 KG/M2 | OXYGEN SATURATION: 93 % | HEART RATE: 78 BPM | RESPIRATION RATE: 20 BRPM | TEMPERATURE: 98.7 F | DIASTOLIC BLOOD PRESSURE: 72 MMHG

## 2024-02-19 DIAGNOSIS — N39.0 URINARY TRACT INFECTION WITHOUT HEMATURIA, SITE UNSPECIFIED: ICD-10-CM

## 2024-02-19 DIAGNOSIS — J06.9 VIRAL URI: ICD-10-CM

## 2024-02-19 LAB
ALBUMIN UR-MCNC: 10 MG/DL
ANION GAP SERPL CALCULATED.3IONS-SCNC: 10 MMOL/L (ref 7–15)
APPEARANCE UR: ABNORMAL
BACTERIA #/AREA URNS HPF: ABNORMAL /HPF
BILIRUB UR QL STRIP: NEGATIVE
BUN SERPL-MCNC: 13 MG/DL (ref 8–23)
CALCIUM SERPL-MCNC: 9.4 MG/DL (ref 8.2–9.6)
CHLORIDE SERPL-SCNC: 97 MMOL/L (ref 98–107)
COLOR UR AUTO: YELLOW
CREAT SERPL-MCNC: 0.59 MG/DL (ref 0.51–0.95)
DEPRECATED HCO3 PLAS-SCNC: 30 MMOL/L (ref 22–29)
EGFRCR SERPLBLD CKD-EPI 2021: 84 ML/MIN/1.73M2
ERYTHROCYTE [DISTWIDTH] IN BLOOD BY AUTOMATED COUNT: 12.8 % (ref 10–15)
FLUAV RNA SPEC QL NAA+PROBE: NEGATIVE
FLUBV RNA RESP QL NAA+PROBE: NEGATIVE
GLUCOSE SERPL-MCNC: 204 MG/DL (ref 70–99)
GLUCOSE UR STRIP-MCNC: NEGATIVE MG/DL
HCT VFR BLD AUTO: 37.4 % (ref 35–47)
HGB BLD-MCNC: 12.4 G/DL (ref 11.7–15.7)
HGB UR QL STRIP: NEGATIVE
HOLD SPECIMEN: NORMAL
KETONES UR STRIP-MCNC: NEGATIVE MG/DL
LEUKOCYTE ESTERASE UR QL STRIP: ABNORMAL
MCH RBC QN AUTO: 31.6 PG (ref 26.5–33)
MCHC RBC AUTO-ENTMCNC: 33.2 G/DL (ref 31.5–36.5)
MCV RBC AUTO: 95 FL (ref 78–100)
NITRATE UR QL: NEGATIVE
PH UR STRIP: 6 [PH] (ref 5–7)
PLATELET # BLD AUTO: 154 10E3/UL (ref 150–450)
POTASSIUM SERPL-SCNC: 4.6 MMOL/L (ref 3.4–5.3)
RBC # BLD AUTO: 3.93 10E6/UL (ref 3.8–5.2)
RBC URINE: 0 /HPF
RSV RNA SPEC NAA+PROBE: NEGATIVE
SARS-COV-2 RNA RESP QL NAA+PROBE: NEGATIVE
SODIUM SERPL-SCNC: 137 MMOL/L (ref 135–145)
SP GR UR STRIP: 1.02 (ref 1–1.03)
SQUAMOUS EPITHELIAL: 1 /HPF
UROBILINOGEN UR STRIP-MCNC: <2 MG/DL
WBC # BLD AUTO: 5.9 10E3/UL (ref 4–11)
WBC URINE: 30 /HPF

## 2024-02-19 PROCEDURE — 81003 URINALYSIS AUTO W/O SCOPE: CPT | Performed by: EMERGENCY MEDICINE

## 2024-02-19 PROCEDURE — 250N000013 HC RX MED GY IP 250 OP 250 PS 637: Performed by: EMERGENCY MEDICINE

## 2024-02-19 PROCEDURE — 87186 SC STD MICRODIL/AGAR DIL: CPT | Performed by: EMERGENCY MEDICINE

## 2024-02-19 PROCEDURE — 36415 COLL VENOUS BLD VENIPUNCTURE: CPT | Performed by: EMERGENCY MEDICINE

## 2024-02-19 PROCEDURE — 87637 SARSCOV2&INF A&B&RSV AMP PRB: CPT | Performed by: EMERGENCY MEDICINE

## 2024-02-19 PROCEDURE — 87086 URINE CULTURE/COLONY COUNT: CPT | Performed by: EMERGENCY MEDICINE

## 2024-02-19 PROCEDURE — 85027 COMPLETE CBC AUTOMATED: CPT | Performed by: EMERGENCY MEDICINE

## 2024-02-19 PROCEDURE — 80048 BASIC METABOLIC PNL TOTAL CA: CPT | Performed by: EMERGENCY MEDICINE

## 2024-02-19 PROCEDURE — 99284 EMERGENCY DEPT VISIT MOD MDM: CPT | Mod: 25

## 2024-02-19 PROCEDURE — 71045 X-RAY EXAM CHEST 1 VIEW: CPT

## 2024-02-19 RX ORDER — OXYMETAZOLINE HYDROCHLORIDE 0.05 G/100ML
2 SPRAY NASAL AT BEDTIME
Qty: 6 ML | Refills: 0 | Status: SHIPPED | OUTPATIENT
Start: 2024-02-19 | End: 2024-02-22

## 2024-02-19 RX ORDER — CEPHALEXIN 500 MG/1
500 CAPSULE ORAL 2 TIMES DAILY
Qty: 10 CAPSULE | Refills: 0 | Status: SHIPPED | OUTPATIENT
Start: 2024-02-19 | End: 2024-02-24

## 2024-02-19 RX ORDER — CEPHALEXIN 500 MG/1
500 CAPSULE ORAL ONCE
Status: COMPLETED | OUTPATIENT
Start: 2024-02-19 | End: 2024-02-19

## 2024-02-19 RX ADMIN — CEPHALEXIN 500 MG: 500 CAPSULE ORAL at 13:55

## 2024-02-19 ASSESSMENT — ACTIVITIES OF DAILY LIVING (ADL)
ADLS_ACUITY_SCORE: 38
ADLS_ACUITY_SCORE: 38

## 2024-02-19 NOTE — ED NOTES
Bed: JNED-23  Expected date: 2/19/24  Expected time:   Means of arrival: Ambulance  Comments:  Perla, 89F, flu symptoms

## 2024-02-19 NOTE — DISCHARGE INSTRUCTIONS
You have a bladder infection, and a cold, but no sign of pneumonia.    You are given the first dose of Keflex here, this is the antibiotic for bladder infection.    You can use the Afrin nasal spray each night to try to clear out the nasal passages for the evening while you sleep.

## 2024-02-19 NOTE — ED NOTES
Patient O2 sats at 88%- instructed patient to deep breath and cough.  Unable to increase oxygen level, placed patient on 2L O2 via NC

## 2024-02-19 NOTE — ED PROVIDER NOTES
EMERGENCY DEPARTMENT ENCOUNTER      NAME: Nae Mandel  AGE: 89 year old female  YOB: 1931  MRN: [id number]  EVALUATION DATE & TIME: 2/19/2024  9:30 AM    PCP: Nora Esteves    ED PROVIDER: Tuan Gonzales M.D.      Chief Complaint   Patient presents with     Flu Symptoms         FINAL IMPRESSION:  1. Viral URI    2. Urinary tract infection without hematuria, site unspecified          ED COURSE & MEDICAL DECISION MAKING:    Pertinent Labs & Imaging studies reviewed below.  All EKGs below represent my independent interpretation.   ED Course as of 02/19/24 2134 Mon Feb 19, 2024   1035 Patient is a 89-year-old woman who presents with her daughter, she was brought in due to feeling sick over the past couple days with decreased energy today, cough.  She has poor memory, is currently in assisted living.  On arrival she is hypertensive at 191/94 with normal temperature, heart rate.  Oxygen is in the low 90s and dropped down to 88 while resting so she was placed on 2 L nasal cannula oxygen.  Lung sounds are clear on exam, she has an occasional cough.  Benign abdominal exam.  She is not tachycardic.  I suspect viral versus bacterial pneumonia, but plan to screen for cytosis, or other source of possible infection.   1045 WBC: 5.9   1120 Symptomatic Influenza A/B, RSV, & SARS-CoV2 PCR (COVID-19) Nasopharyngeal  Negative viral panel   1222 XR Chest Port 1 View  Lungs are clear. No evidence of pneumonia. No pleural effusions or pneumothorax. Normal pulmonary vascularity. Nonenlarged cardiac silhouette.   1314 UA with Microscopic reflex to Culture(!)  Possible UTI   2132 Patient has been able to come off supplemental oxygen, and lung sounds remain clear.  She appears much more alert and awake than when she arrived.  She likely has a viral upper respiratory infection and with her urine showing moderate bacteria, leukocyte esterase positive and WBCs today recommended starting Keflex.  She was given the  first dose of that here.  Safe for discharge at this time.       Additional ED Course Timestamps:  10:12 AM I met with the patient, obtained history, performed an initial exam, and discussed options and plan for diagnostics and treatment here in the ED.      Medical Decision Making  Obtained supplemental history:Supplemental history obtained?: No  Reviewed external records: External records reviewed?: No  Care impacted by chronic illness:Chronic Kidney Disease, Dementia, Diabetes, Hyperlipidemia, and Hypertension  Care significantly affected by social determinants of health:N/A  Did you consider but not order tests?: Work up considered but not performed and documented in chart, if applicable  Did you interpret images independently?: Independent interpretation of ECG and images noted in documentation, when applicable.  Consultation discussion with other provider:Did you involve another provider (consultant, , pharmacy, etc.)?: No  Discharge. I prescribed additional prescription strength medication(s) as charted. N/A.    At the conclusion of the encounter I discussed the results of all of the tests and the disposition. The questions were answered. The patient or family acknowledged understanding and was agreeable with the care plan.     MEDICATIONS GIVEN IN THE EMERGENCY:  Medications   cephALEXin (KEFLEX) capsule 500 mg (500 mg Oral $Given 2/19/24 1355)         NEW PRESCRIPTIONS STARTED AT TODAY'S ER VISIT  Discharge Medication List as of 2/19/2024  1:46 PM        START taking these medications    Details   !! cephALEXin (KEFLEX) 500 MG capsule Take 1 capsule (500 mg) by mouth 2 times daily for 5 days, Disp-10 capsule, R-0, E-Prescribe      oxymetazoline (AFRIN) 0.05 % nasal spray Spray 0.2 mLs (2 sprays) into both nostrils at bedtime for 3 days, Disp-6 mL, R-0, E-Prescribe       !! - Potential duplicate medications found. Please discuss with provider.          "    =================================================================    HPI    Patient information was obtained from: Patient and patient's daughter    Use of : N/A         Nae Mandel is a 89 year old female with a pertinent history of HTN, HLD, DM II, CKD, dementia, who presents to this ED for evaluation of flu symptoms.     Per patient's daughter, the patient has had a cough for the past few days, and today she feels more weak and tired. The patient does not remember if she has recently had any fevers, chills, abdominal pain or diarrhea. Patient history was limited.        VITALS:  BP (!) 161/72   Pulse 78   Temp 98.7  F (37.1  C)   Resp 20   Ht 1.676 m (5' 6\")   Wt 70.8 kg (156 lb)   SpO2 93%   BMI 25.18 kg/m      PHYSICAL EXAM    Constitutional: Well developed, well nourished. Comfortable appearing.   HENT: Normocephalic, atraumatic, mucous membranes moist, nose normal. Neck- Supple, gross ROM intact. Nasal congestion, 2 L nasal cannula, oxygen at 97%.  Eyes: Pupils mid-range, conjunctiva without injection, no discharge.   Respiratory: Clear to auscultation bilaterally, no respiratory distress, no wheezing, speaks full sentences easily.   Cardiovascular: Normal heart rate, regular rhythm, no murmurs.   GI: Soft, no tenderness to deep palpation in all quadrants, no masses.  Musculoskeletal: Moving all 4 extremities intentionally and without pain. No obvious deformity.  Skin: Warm, dry, no rash.  Neurologic: Cranial nerves grossly intact. Hard of hearing, poor recall of recent events.   Psychiatric: Affect normal, cooperative.    I, Veronica Philip am serving as a scribe to document services personally performed by Dr. Tuan Gonzales based on my observation and the provider's statements to me. ITuan MD attest that Veronica Philip is acting in a scribe capacity, has observed my performance of the services and has documented them in accordance with my direction.    Tuan" AJ Gonzales.  Emergency Medicine  [facility] EMERGENCY DEPARTMENT  [facility] [facility] [id number] [phone]  Dept: [phone]       Tuan Gonzales MD  02/19/24 8167

## 2024-02-19 NOTE — ED TRIAGE NOTES
"Patient is from [facility] living via [facility]. Family reported that the patient was becoming \"sick\" yesterday and was increasing congested and had a productive cough today.     98.4 temp with increase fatigue     BS was 208 patient is a diabetic and Vitals were normal     Unclear is family is coming to be at bedside      Triage Assessment (Adult)       Row Name 02/19/24 0984          Triage Assessment    Airway WDL WDL        Respiratory WDL    Respiratory WDL cough;X        Skin Circulation/Temperature WDL    Skin Circulation/Temperature WDL WDL        Cardiac WDL    Cardiac WDL WDL        Peripheral/Neurovascular WDL    Peripheral Neurovascular WDL WDL        Cognitive/Neuro/Behavioral WDL    Cognitive/Neuro/Behavioral WDL WDL                     " 97

## 2024-02-20 ENCOUNTER — APPOINTMENT (OUTPATIENT)
Dept: RADIOLOGY | Facility: HOSPITAL | Age: 89
End: 2024-02-20
Attending: EMERGENCY MEDICINE
Payer: COMMERCIAL

## 2024-02-20 ENCOUNTER — HOSPITAL ENCOUNTER (EMERGENCY)
Facility: HOSPITAL | Age: 89
Discharge: HOME | End: 2024-02-20
Attending: STUDENT IN AN ORGANIZED HEALTH CARE EDUCATION/TRAINING PROGRAM | Admitting: STUDENT IN AN ORGANIZED HEALTH CARE EDUCATION/TRAINING PROGRAM
Payer: COMMERCIAL

## 2024-02-20 ENCOUNTER — APPOINTMENT (OUTPATIENT)
Dept: CT IMAGING | Facility: HOSPITAL | Age: 89
End: 2024-02-20
Attending: EMERGENCY MEDICINE
Payer: COMMERCIAL

## 2024-02-20 ENCOUNTER — MEDICAL CORRESPONDENCE (OUTPATIENT)
Dept: HEALTH INFORMATION MANAGEMENT | Facility: CLINIC | Age: 89
End: 2024-02-20

## 2024-02-20 VITALS
HEART RATE: 75 BPM | OXYGEN SATURATION: 96 % | DIASTOLIC BLOOD PRESSURE: 72 MMHG | RESPIRATION RATE: 21 BRPM | SYSTOLIC BLOOD PRESSURE: 160 MMHG | TEMPERATURE: 98.5 F

## 2024-02-20 DIAGNOSIS — N39.0 URINARY TRACT INFECTION WITH HEMATURIA, SITE UNSPECIFIED: ICD-10-CM

## 2024-02-20 DIAGNOSIS — R31.9 URINARY TRACT INFECTION WITH HEMATURIA, SITE UNSPECIFIED: ICD-10-CM

## 2024-02-20 DIAGNOSIS — R41.0 DELIRIUM: ICD-10-CM

## 2024-02-20 LAB
ACANTHOCYTES BLD QL SMEAR: ABNORMAL
AMMONIA PLAS-SCNC: 23 UMOL/L (ref 11–51)
ANION GAP SERPL CALCULATED.3IONS-SCNC: 10 MMOL/L (ref 7–15)
ATRIAL RATE - MUSE: 76 BPM
AUER BODIES BLD QL SMEAR: ABNORMAL
BASO STIPL BLD QL SMEAR: ABNORMAL
BASOPHILS # BLD AUTO: 0 10E3/UL (ref 0–0.2)
BASOPHILS NFR BLD AUTO: 0 %
BITE CELLS BLD QL SMEAR: ABNORMAL
BLISTER CELLS BLD QL SMEAR: ABNORMAL
BUN SERPL-MCNC: 19.6 MG/DL (ref 8–23)
BURR CELLS BLD QL SMEAR: ABNORMAL
CALCIUM SERPL-MCNC: 9.2 MG/DL (ref 8.2–9.6)
CHLORIDE SERPL-SCNC: 95 MMOL/L (ref 98–107)
CREAT SERPL-MCNC: 0.77 MG/DL (ref 0.51–0.95)
DACRYOCYTES BLD QL SMEAR: ABNORMAL
DEPRECATED HCO3 PLAS-SCNC: 30 MMOL/L (ref 22–29)
DIASTOLIC BLOOD PRESSURE - MUSE: NORMAL MMHG
EGFRCR SERPLBLD CKD-EPI 2021: 72 ML/MIN/1.73M2
ELLIPTOCYTES BLD QL SMEAR: ABNORMAL
EOSINOPHIL # BLD AUTO: 0.2 10E3/UL (ref 0–0.7)
EOSINOPHIL NFR BLD AUTO: 3 %
ERYTHROCYTE [DISTWIDTH] IN BLOOD BY AUTOMATED COUNT: 13 % (ref 10–15)
FRAGMENTS BLD QL SMEAR: ABNORMAL
GLUCOSE SERPL-MCNC: 254 MG/DL (ref 70–99)
HCT VFR BLD AUTO: 36 % (ref 35–47)
HGB BLD-MCNC: 11.8 G/DL (ref 11.7–15.7)
HGB C CRYSTALS: ABNORMAL
HOLD SPECIMEN: NORMAL
HOLD SPECIMEN: NORMAL
HOWELL-JOLLY BOD BLD QL SMEAR: ABNORMAL
IMM GRANULOCYTES # BLD: 0 10E3/UL
IMM GRANULOCYTES NFR BLD: 0 %
INTERPRETATION ECG - MUSE: NORMAL
LACTATE SERPL-SCNC: 1.6 MMOL/L (ref 0.7–2)
LYMPHOCYTES # BLD AUTO: 1.4 10E3/UL (ref 0.8–5.3)
LYMPHOCYTES NFR BLD AUTO: 19 %
MCH RBC QN AUTO: 31.3 PG (ref 26.5–33)
MCHC RBC AUTO-ENTMCNC: 32.8 G/DL (ref 31.5–36.5)
MCV RBC AUTO: 96 FL (ref 78–100)
MONOCYTES # BLD AUTO: 0.5 10E3/UL (ref 0–1.3)
MONOCYTES NFR BLD AUTO: 7 %
NEUTROPHILS # BLD AUTO: 5.2 10E3/UL (ref 1.6–8.3)
NEUTROPHILS NFR BLD AUTO: 71 %
NEUTS HYPERSEG BLD QL SMEAR: ABNORMAL
NRBC # BLD AUTO: 0 10E3/UL
NRBC BLD AUTO-RTO: 0 /100
P AXIS - MUSE: 50 DEGREES
PLAT MORPH BLD: ABNORMAL
PLATELET # BLD AUTO: ABNORMAL 10*3/UL
POLYCHROMASIA BLD QL SMEAR: ABNORMAL
POTASSIUM SERPL-SCNC: 4.2 MMOL/L (ref 3.4–5.3)
PR INTERVAL - MUSE: 188 MS
QRS DURATION - MUSE: 84 MS
QT - MUSE: 432 MS
QTC - MUSE: 486 MS
R AXIS - MUSE: 13 DEGREES
RBC # BLD AUTO: 3.77 10E6/UL (ref 3.8–5.2)
RBC AGGLUT BLD QL: ABNORMAL
RBC MORPH BLD: ABNORMAL
ROULEAUX BLD QL SMEAR: ABNORMAL
SICKLE CELLS BLD QL SMEAR: ABNORMAL
SMUDGE CELLS BLD QL SMEAR: ABNORMAL
SODIUM SERPL-SCNC: 135 MMOL/L (ref 135–145)
SPHEROCYTES BLD QL SMEAR: ABNORMAL
STOMATOCYTES BLD QL SMEAR: ABNORMAL
SYSTOLIC BLOOD PRESSURE - MUSE: NORMAL MMHG
T AXIS - MUSE: 28 DEGREES
TARGETS BLD QL SMEAR: ABNORMAL
TOXIC GRANULES BLD QL SMEAR: ABNORMAL
VARIANT LYMPHS BLD QL SMEAR: ABNORMAL
VENTRICULAR RATE- MUSE: 76 BPM
WBC # BLD AUTO: 7.3 10E3/UL (ref 4–11)

## 2024-02-20 PROCEDURE — 80048 BASIC METABOLIC PNL TOTAL CA: CPT | Performed by: EMERGENCY MEDICINE

## 2024-02-20 PROCEDURE — 85041 AUTOMATED RBC COUNT: CPT | Performed by: EMERGENCY MEDICINE

## 2024-02-20 PROCEDURE — 99285 EMERGENCY DEPT VISIT HI MDM: CPT | Mod: 25

## 2024-02-20 PROCEDURE — 36415 COLL VENOUS BLD VENIPUNCTURE: CPT | Performed by: EMERGENCY MEDICINE

## 2024-02-20 PROCEDURE — 93005 ELECTROCARDIOGRAM TRACING: CPT | Performed by: EMERGENCY MEDICINE

## 2024-02-20 PROCEDURE — 71045 X-RAY EXAM CHEST 1 VIEW: CPT

## 2024-02-20 PROCEDURE — 83605 ASSAY OF LACTIC ACID: CPT | Performed by: EMERGENCY MEDICINE

## 2024-02-20 PROCEDURE — 70450 CT HEAD/BRAIN W/O DYE: CPT

## 2024-02-20 PROCEDURE — 82140 ASSAY OF AMMONIA: CPT | Performed by: EMERGENCY MEDICINE

## 2024-02-20 ASSESSMENT — ACTIVITIES OF DAILY LIVING (ADL)
ADLS_ACUITY_SCORE: 38
ADLS_ACUITY_SCORE: 38

## 2024-02-21 LAB — BACTERIA UR CULT: ABNORMAL

## 2024-02-21 NOTE — ED NOTES
Bed: JNEDH-B  Expected date: 2/20/24  Expected time: 6:33 PM  Means of arrival: Ambulance  Comments:  Perla   89F  Lethargy

## 2024-02-21 NOTE — ED TRIAGE NOTES
Patient brought in by [facility] from her assisted living. Per medic report, patient was seen and discharged from this ED yesterday and diagnosed with UTI and URI. Since returning to assisted living, staff note patient to be somnolent. She had one dose of antibiotic this morning.  On arrival to ED, patient is arouseable to voice, pleasant, and denies pain. Sats 88% on room air. She is oriented to self however she is confused at baseline.

## 2024-02-21 NOTE — ED PROVIDER NOTES
EMERGENCY DEPARTMENT ENCOUNTER     NAME: Nae Mandel   AGE: 89 year old female   YOB: 1931   MRN: [id number]   EVALUATION DATE & TIME: 2/20/2024  6:39 PM   PCP: Nora Esteves     Chief Complaint   Patient presents with    Altered Mental Status   :    FINAL IMPRESSION       1. Delirium    2. Urinary tract infection with hematuria, site unspecified           ED COURSE & MEDICAL DECISION MAKING    7:10 PM Met with the patient and performed my initial exam.    Pertinent Labs & Imaging studies reviewed. (See chart for details)   89 year old female  presents to the Emergency Department for evaluation of concern about her mental status as they noticed that she was having some hallucinations earlier today.  She was seen in the ED yesterday and diagnosed with a UTI and started on antibiotics which were started this morning. Initial Vitals Reviewed. Initial exam notable for patient who initially did seem a little confused on arrival and did not know why she was here, but then she had a clearing of her sensorium and when her son arrived is alert and oriented x 3.  She does not have any focal neurologic deficits that would suggest something like a CVA.  I did get a CT of her head, labs that do not show any leukocytosis or electrolyte deficiencies, signs of sepsis or systemic infection.  With her mental status that seems to be waxing and waning I think this is likely a mild delirium from the UTI that is already known.  Ultimately I discussed this with her son and neither of us feels that it would be beneficial for her to be admitted as she does not need IV antibiotics and is in a monitored setting.  They will continue home treatment and she will be discharged back to facility with return precautions.           At the conclusion of the encounter I discussed the results of all of the tests and the disposition. The questions were answered. The patient or family acknowledged understanding and was agreeable  with the care plan.     0 minutes critical care time, see procedure note below for details if relevant    Medical Decision Making    History:  Supplemental history from: Documented in chart, EMS, son  External Record(s) reviewed: Inpatient Record: ED visit to [facility] ED for flu symptoms on 2/19/2024    Work Up:  Chart documentation includes differential considered and any EKGs or imaging independently interpreted by provider, where specified.  In additional to work up documented, I considered the following work up: Documented in chart, if applicable.    External consultation:  Discussion of management with another provider: Documented in chart, if applicable    Complicating factors:  Care impacted by chronic illness: Diabetes, Hyperlipidemia, Hypertension, and Other: OA  Care affected by social determinants of health: N/A    Disposition considerations: Discharge. I recommended the patient continue their current prescription strength medication(s): antibiotics for UTI. I considered admission, but ultimately discharged patient with reassuring workup.        MEDICATIONS GIVEN IN THE EMERGENCY:   Medications - No data to display   NEW PRESCRIPTIONS STARTED AT TODAY'S ER VISIT   New Prescriptions    No medications on file     ================================================================   HISTORY OF PRESENT ILLNESS       Patient information was obtained from: EMS, RN, and patient   Use of Intrepreter: N/A    Nae Mandel is a 89 year old female with history of T2DM, HTN, confusion, HLD, OA, and cervical spine crepitus, who presents with fall.    Per chart review, the patient presented to [facility] ED for flu symptoms on 2/19/2024. Patient was feeling sick over the past couple days with decreased energy today and cough. Suspected URI. Urine showed moderate bacteria, leukocyte esterase positive and WBC 5.9. Patient was started on 500 mg og Keflex and Afrin  0.05% nasal spray. Patient was discharge to her assisted living facility.    Per EMS, assisted living facility patient resides in reports patient has been more fatigue and somnolent. She was recently diagnosed with a UTI and was given a dose of an antibiotic this morning. She is confused at baseline. Per RN, patient didn't have a ulnar drift. She was hypoxic upon arrival to ER with O2 sats of 88%. She was placed on 1 L of oxygen and O2 sats went up to 95%.    Per patient, patient denies pain. No other reported complaints or concerns at this time.    ================================================================        PAST HISTORY     PAST MEDICAL HISTORY:   No past medical history on file.   PAST SURGICAL HISTORY:   Past Surgical History:   Procedure Laterality Date    ARTHROPLASTY KNEE      OTHER SURGICAL HISTORY      Bilateral hip arthroplasty      CURRENT MEDICATIONS:   aspirin 81 MG EC tablet  atorvastatin (LIPITOR) 20 MG tablet  cephalexin (KEFLEX) 250 MG capsule  cephALEXin (KEFLEX) 500 MG capsule  dorzolamide (TRUSOPT) 2 % ophthalmic solution  fish oil-omega-3 fatty acids 1000 MG capsule  ibuprofen (ADVIL/MOTRIN) 200 MG tablet  latanoprost (XALATAN) 0.005 % ophthalmic solution  lisinopril (ZESTRIL) 2.5 MG tablet  metoprolol succinate ER (TOPROL-XL) 50 MG 24 hr tablet  multivitamin w/minerals (THERA-VIT-M) tablet  oxybutynin ER (DITROPAN-XL) 5 MG 24 hr tablet  oxymetazoline (AFRIN) 0.05 % nasal spray  Turmeric 500 MG CAPS      ALLERGIES:   Allergies   Allergen Reactions    Epinephrine Unknown      FAMILY HISTORY:   Family History   Problem Relation Age of Onset    Hereditary Breast and Ovarian Cancer Syndrome No family hx of     Breast Cancer No family hx of     Cancer No family hx of     Colon Cancer No family hx of     Endometrial Cancer No family hx of     Ovarian Cancer No family hx of       SOCIAL HISTORY:   Social History     Socioeconomic History    Marital status:    Tobacco Use    Smoking  status: Never   Substance and Sexual Activity    Alcohol use: No    Drug use: No        VITALS  Patient Vitals for the past 24 hrs:   BP Temp Temp src Pulse Resp SpO2   02/20/24 2007 (!) 160/72 -- -- 77 -- 96 %   02/20/24 1945 (!) 153/82 -- -- 75 -- 97 %   02/20/24 1930 136/70 -- -- 76 -- 97 %   02/20/24 1923 136/66 -- -- 77 -- 95 %   02/20/24 1915 -- -- -- 76 -- 95 %   02/20/24 1900 116/55 -- -- 76 -- 97 %   02/20/24 1845 119/55 -- -- 78 -- 95 %   02/20/24 1843 -- -- -- -- -- 95 %   02/20/24 1841 133/66 98.5  F (36.9  C) Oral 80 20 (!) 88 %        ================================================================    PHYSICAL EXAM     VITAL SIGNS: BP (!) 160/72   Pulse 77   Temp 98.5  F (36.9  C) (Oral)   Resp 20   SpO2 96%    Constitutional:  Awake, no acute distress   HENT:  Atraumatic, oropharynx without exudate or erythema, membranes moist  Lymph:  No adenopathy  Eyes: EOM intact, PERRL, no injection  Neck: Supple  Respiratory:  Clear to auscultation bilaterally, no wheezes or crackles   Cardiovascular:  Regular rate and rhythm, single S1 and S2   GI:  Soft, nontender, nondistended, no rebound or guarding   Musculoskeletal:  Moves all extremities, no lower extremity edema, no deformities    Skin:  Warm, dry  Neurologic:  Alert , no focal deficits noted, initially somewhat confused and could not tell us what day it is, but when I reevaluate now she is alert and oriented x 3      ================================================================  LAB       All pertinent labs reviewed and interpreted.   Labs Ordered and Resulted from Time of ED Arrival to Time of ED Departure   BASIC METABOLIC PANEL - Abnormal       Result Value    Sodium 135      Potassium 4.2      Chloride 95 (*)     Carbon Dioxide (CO2) 30 (*)     Anion Gap 10      Urea Nitrogen 19.6      Creatinine 0.77      GFR Estimate 72      Calcium 9.2      Glucose 254 (*)    CBC WITH PLATELETS AND DIFFERENTIAL - Abnormal    WBC Count 7.3      RBC Count  3.77 (*)     Hemoglobin 11.8      Hematocrit 36.0      MCV 96      MCH 31.3      MCHC 32.8      RDW 13.0      Platelet Count        % Neutrophils        % Lymphocytes        % Monocytes        % Eosinophils        % Basophils        % Immature Granulocytes        Absolute Neutrophils        Absolute Lymphocytes        Absolute Monocytes        Absolute Eosinophils        Absolute Basophils        Absolute Immature Granulocytes       RBC AND PLATELET MORPHOLOGY - Abnormal    Platelet Assessment Platelets Clumped (*)     Acanthocytes        Luca Rods        Basophilic Stippling        Bite Cells        Blister Cells        Mattaponi Cells        Elliptocytes        Hgb C Crystals        Longoria-Jolly Bodies        Hypersegmented Neutrophils        Polychromasia        RBC agglutination        RBC Fragments        Reactive Lymphocytes        Rouleaux        Sickle Cells        Smudge Cells        Spherocytes        Stomatocytes        Target Cells        Teardrop Cells        Toxic Neutrophils        RBC Morphology Confirmed RBC Indices     LACTIC ACID WHOLE BLOOD - Normal    Lactic Acid 1.6     AMMONIA   ROUTINE UA WITH MICROSCOPIC REFLEX TO CULTURE        ===============================================================  RADIOLOGY       Reviewed all pertinent imaging. Please see official radiology report.   XR Chest 1 View   Final Result   IMPRESSION: Hypoexpanded lungs, though grossly clear. No pleural effusion or pneumothorax. Stable cardiomediastinal silhouette.         Head CT w/o contrast   Final Result   IMPRESSION:   1.  Chronic changes without acute intracranial abnormality. Extensive paranasal sinus disease.            ================================================================  EKG     EKG reviewed interpreted by me shows sinus rhythm with a rate of 76, normal axis, motion artifact but no acute ST or T wave changes since October 2021    I have independently reviewed and interpreted the EKG(s) documented above.      ================================================================  PROCEDURES         I, Itzel Dwyer, am serving as a scribe to document services personally performed by Dr. Rachel based on my observation and the provider's statements to me. I, Lizz Rachel MD attest that Itzel Dwyer is acting in a scribe capacity, has observed my performance of the services and has documented them in accordance with my direction.   Lizz Rachel M.D.   Emergency Medicine   [facility] EMERGENCY DEPARTMENT  [address] [place] [id number]  [phone]  Dept: [phone]      Lizz Rachel MD  02/20/24 6096

## 2024-02-21 NOTE — ED NOTES
Patient's daughter Stefani called to provide more information and receive update. Per daughter, patient has been very confused for the past few days and hallucinating yesterday. Reportedly she has been somnolent and lethargic since yesterday afternoon. Stefani is asking about a possible stroke due to history of many TIAs.  Dr. Rachel was notified.

## 2024-02-21 NOTE — DISCHARGE INSTRUCTIONS
Continue treatment for the UTI as it appears to be working as there are no signs that she has developed any systemic infection, all labs and brain imaging are reassuring.  Most likely, this waxing and waning mental status change is consistent with delirium from the underlying UTI and will improve with treatment.

## 2024-02-22 ENCOUNTER — TELEPHONE (OUTPATIENT)
Dept: EMERGENCY MEDICINE | Facility: HOSPITAL | Age: 89
End: 2024-02-22
Payer: COMMERCIAL

## 2024-02-22 NOTE — TELEPHONE ENCOUNTER
[facility]    Reason for call: Lab Result Notification     Lab Result (including Rx patient on, if applicable).  If culture, copy of lab report at bottom.  Lab Result: Final Urine Culture Report on 2/21/24  [facility] Emergency Dept discharge antibiotic prescribed: Cephalexin (Keflex) 500 mg capsule, 1 capsule (500 mg) by mouth 2 times daily for 5 days.   #1. Bacteria, >100,000 CFU/ML Escherichia coli ESBL ,  is [RESISTANT] to antibiotic.   Recommendations in treatment per [facility] ED lab result Urine Culture protocol.     Creatinine Level (mg/dl)   Creatinine   Date Value Ref Range Status   02/20/2024 0.77 0.51 - 0.95 mg/dL Final    Creatinine clearance (ml/min), if applicable    Serum creatinine: 0.77 mg/dL 02/20/24 1916  Estimated creatinine clearance: 52.1 mL/min     Called and spoke to Nurse Alaina at [facility].    Faxing report to the following:  [facility]   Phone no: [phone]  Facility Fax number: 3rd floor Nurses Station [phone]  Other Nurses station [phone]  Attn: Tania Gonzalez (provider)    RN Recommendations/Instructions per [facility] ED lab result protocol:   [facility] ED lab result protocol utilized: Sunita Stanton, RN

## 2024-04-19 ENCOUNTER — LAB REQUISITION (OUTPATIENT)
Dept: LAB | Facility: CLINIC | Age: 89
End: 2024-04-19
Payer: COMMERCIAL

## 2024-04-19 DIAGNOSIS — N18.9 CHRONIC KIDNEY DISEASE, UNSPECIFIED: ICD-10-CM

## 2024-04-22 LAB
ANION GAP SERPL CALCULATED.3IONS-SCNC: 15 MMOL/L (ref 7–15)
BUN SERPL-MCNC: 16.6 MG/DL (ref 8–23)
CALCIUM SERPL-MCNC: 9.5 MG/DL (ref 8.2–9.6)
CHLORIDE SERPL-SCNC: 100 MMOL/L (ref 98–107)
CREAT SERPL-MCNC: 0.58 MG/DL (ref 0.51–0.95)
DEPRECATED HCO3 PLAS-SCNC: 23 MMOL/L (ref 22–29)
EGFRCR SERPLBLD CKD-EPI 2021: 84 ML/MIN/1.73M2
GLUCOSE SERPL-MCNC: 284 MG/DL (ref 70–99)
HBA1C MFR BLD: 8.2 %
POTASSIUM SERPL-SCNC: 4.3 MMOL/L (ref 3.4–5.3)
SODIUM SERPL-SCNC: 138 MMOL/L (ref 135–145)

## 2024-04-22 PROCEDURE — 83036 HEMOGLOBIN GLYCOSYLATED A1C: CPT | Mod: ORL | Performed by: INTERNAL MEDICINE

## 2024-04-22 PROCEDURE — 36415 COLL VENOUS BLD VENIPUNCTURE: CPT | Mod: ORL | Performed by: INTERNAL MEDICINE

## 2024-04-22 PROCEDURE — P9604 ONE-WAY ALLOW PRORATED TRIP: HCPCS | Mod: ORL | Performed by: INTERNAL MEDICINE

## 2024-04-22 PROCEDURE — 80048 BASIC METABOLIC PNL TOTAL CA: CPT | Mod: ORL | Performed by: INTERNAL MEDICINE

## 2024-06-10 ENCOUNTER — LAB REQUISITION (OUTPATIENT)
Dept: LAB | Facility: CLINIC | Age: 89
End: 2024-06-10
Payer: COMMERCIAL

## 2024-06-10 DIAGNOSIS — E11.9 TYPE 2 DIABETES MELLITUS WITHOUT COMPLICATIONS (H): ICD-10-CM

## 2024-06-10 LAB
ALBUMIN SERPL BCG-MCNC: 4.4 G/DL (ref 3.5–5.2)
ALP SERPL-CCNC: 105 U/L (ref 40–150)
ALT SERPL W P-5'-P-CCNC: 17 U/L (ref 0–50)
ANION GAP SERPL CALCULATED.3IONS-SCNC: 13 MMOL/L (ref 7–15)
AST SERPL W P-5'-P-CCNC: 14 U/L (ref 0–45)
BILIRUB SERPL-MCNC: 0.4 MG/DL
BUN SERPL-MCNC: 16.1 MG/DL (ref 8–23)
CALCIUM SERPL-MCNC: 9.6 MG/DL (ref 8.2–9.6)
CHLORIDE SERPL-SCNC: 98 MMOL/L (ref 98–107)
CHOLEST SERPL-MCNC: 150 MG/DL
CREAT SERPL-MCNC: 0.65 MG/DL (ref 0.51–0.95)
DEPRECATED HCO3 PLAS-SCNC: 23 MMOL/L (ref 22–29)
EGFRCR SERPLBLD CKD-EPI 2021: 82 ML/MIN/1.73M2
FASTING STATUS PATIENT QL REPORTED: ABNORMAL
FASTING STATUS PATIENT QL REPORTED: ABNORMAL
GLUCOSE SERPL-MCNC: 413 MG/DL (ref 70–99)
HDLC SERPL-MCNC: 39 MG/DL
LDLC SERPL CALC-MCNC: 45 MG/DL
NONHDLC SERPL-MCNC: 111 MG/DL
POTASSIUM SERPL-SCNC: 4.4 MMOL/L (ref 3.4–5.3)
PROT SERPL-MCNC: 6.5 G/DL (ref 6.4–8.3)
SODIUM SERPL-SCNC: 134 MMOL/L (ref 135–145)
TRIGL SERPL-MCNC: 331 MG/DL

## 2024-06-10 PROCEDURE — 80061 LIPID PANEL: CPT | Mod: ORL | Performed by: FAMILY MEDICINE

## 2024-06-10 PROCEDURE — 80053 COMPREHEN METABOLIC PANEL: CPT | Mod: ORL | Performed by: FAMILY MEDICINE
